# Patient Record
Sex: FEMALE | Race: WHITE
[De-identification: names, ages, dates, MRNs, and addresses within clinical notes are randomized per-mention and may not be internally consistent; named-entity substitution may affect disease eponyms.]

---

## 2017-02-20 ENCOUNTER — HOSPITAL ENCOUNTER (OUTPATIENT)
Dept: HOSPITAL 17 - PLAB | Age: 57
End: 2017-02-20
Attending: FAMILY MEDICINE
Payer: COMMERCIAL

## 2017-02-20 DIAGNOSIS — R73.01: ICD-10-CM

## 2017-02-20 DIAGNOSIS — R53.83: ICD-10-CM

## 2017-02-20 DIAGNOSIS — I25.10: Primary | ICD-10-CM

## 2017-02-20 DIAGNOSIS — E78.2: ICD-10-CM

## 2017-02-20 DIAGNOSIS — I10: ICD-10-CM

## 2017-02-20 DIAGNOSIS — M32.9: ICD-10-CM

## 2017-02-20 LAB
ALP SERPL-CCNC: 98 U/L (ref 45–117)
ALT SERPL-CCNC: 29 U/L (ref 10–53)
ANION GAP SERPL CALC-SCNC: 9 MEQ/L (ref 5–15)
AST SERPL-CCNC: 27 U/L (ref 15–37)
BILIRUB SERPL-MCNC: 0.4 MG/DL (ref 0.2–1)
BUN SERPL-MCNC: 15 MG/DL (ref 7–18)
CHLORIDE SERPL-SCNC: 104 MEQ/L (ref 98–107)
ERYTHROCYTE [DISTWIDTH] IN BLOOD BY AUTOMATED COUNT: 14.8 % (ref 11.6–17.2)
ERYTHROCYTE [SEDIMENTATION RATE] IN BLOOD BY WESTERGREN METHOD: 31 MM/HR (ref 0–30)
FERRITIN SERPL-MCNC: 33 NG/ML (ref 8–252)
GFR SERPLBLD BASED ON 1.73 SQ M-ARVRAT: 64 ML/MIN (ref 89–?)
HCO3 BLD-SCNC: 29 MEQ/L (ref 21–32)
HCT VFR BLD CALC: 32.8 % (ref 35–46)
HDLC SERPL-MCNC: 60 MG/DL (ref 40–60)
HEMOGLOBIN A1A: 1.1 %
HEMOGLOBIN A1B: 0.9 %
HEMOGLOBIN AO: 85.7 %
HEMOGLOBIN LA1C: 1.8 %
HEMOGLOBIN P3: 3.5 %
HGB F MFR BLD: 1.2 %
LDLC SERPL DIRECT ASSAY-MCNC: 90 MG/DL (ref 0–99)
LDLC SERPL-MCNC: 73 MG/DL (ref 0–99)
MAGNESIUM SERPL-MCNC: 2.3 MG/DL (ref 1.5–2.5)
MCH RBC QN AUTO: 26.7 PG (ref 27–34)
MCHC RBC AUTO-ENTMCNC: 32.5 % (ref 32–36)
MCV RBC AUTO: 82.1 FL (ref 80–100)
PLATELET # BLD: 303 TH/MM3 (ref 150–450)
POTASSIUM SERPL-SCNC: 4 MEQ/L (ref 3.5–5.1)
RBC # BLD AUTO: 4 MIL/MM3 (ref 4–5.3)
REVIEW FLAG: (no result)
SODIUM SERPL-SCNC: 142 MEQ/L (ref 136–145)
TRANSFERRIN IRON PROFILE: 335 MG/DL (ref 200–360)
WBC # BLD AUTO: 5.8 TH/MM3 (ref 4–11)

## 2017-02-20 PROCEDURE — 80053 COMPREHEN METABOLIC PANEL: CPT

## 2017-02-20 PROCEDURE — 85027 COMPLETE CBC AUTOMATED: CPT

## 2017-02-20 PROCEDURE — 83721 ASSAY OF BLOOD LIPOPROTEIN: CPT

## 2017-02-20 PROCEDURE — 80061 LIPID PANEL: CPT

## 2017-02-20 PROCEDURE — 83735 ASSAY OF MAGNESIUM: CPT

## 2017-02-20 PROCEDURE — 85652 RBC SED RATE AUTOMATED: CPT

## 2017-02-20 PROCEDURE — 83540 ASSAY OF IRON: CPT

## 2017-02-20 PROCEDURE — 83550 IRON BINDING TEST: CPT

## 2017-02-20 PROCEDURE — 82728 ASSAY OF FERRITIN: CPT

## 2017-02-20 PROCEDURE — 84443 ASSAY THYROID STIM HORMONE: CPT

## 2017-02-20 PROCEDURE — 83036 HEMOGLOBIN GLYCOSYLATED A1C: CPT

## 2017-05-02 ENCOUNTER — HOSPITAL ENCOUNTER (OUTPATIENT)
Dept: HOSPITAL 17 - PLAB | Age: 57
End: 2017-05-02
Attending: FAMILY MEDICINE
Payer: COMMERCIAL

## 2017-05-02 DIAGNOSIS — I25.10: Primary | ICD-10-CM

## 2017-05-02 DIAGNOSIS — I10: ICD-10-CM

## 2017-05-02 DIAGNOSIS — E78.2: ICD-10-CM

## 2017-05-02 DIAGNOSIS — R53.83: ICD-10-CM

## 2017-05-02 LAB
ALP SERPL-CCNC: 114 U/L (ref 45–117)
ALT SERPL-CCNC: 23 U/L (ref 10–53)
ANION GAP SERPL CALC-SCNC: 12 MEQ/L (ref 5–15)
AST SERPL-CCNC: 19 U/L (ref 15–37)
BILIRUB SERPL-MCNC: 0.4 MG/DL (ref 0.2–1)
BUN SERPL-MCNC: 12 MG/DL (ref 7–18)
CHLORIDE SERPL-SCNC: 100 MEQ/L (ref 98–107)
ERYTHROCYTE [DISTWIDTH] IN BLOOD BY AUTOMATED COUNT: 17.6 % (ref 11.6–17.2)
GFR SERPLBLD BASED ON 1.73 SQ M-ARVRAT: 79 ML/MIN (ref 89–?)
HCO3 BLD-SCNC: 31.8 MEQ/L (ref 21–32)
HCT VFR BLD CALC: 31.1 % (ref 35–46)
HDLC SERPL-MCNC: 41.5 MG/DL (ref 40–60)
LDLC SERPL DIRECT ASSAY-MCNC: 79 MG/DL (ref 0–99)
LDLC SERPL-MCNC: 67 MG/DL (ref 0–99)
MCH RBC QN AUTO: 25.4 PG (ref 27–34)
MCHC RBC AUTO-ENTMCNC: 31.2 % (ref 32–36)
MCV RBC AUTO: 81.5 FL (ref 80–100)
PLATELET # BLD: 328 TH/MM3 (ref 150–450)
POTASSIUM SERPL-SCNC: 3.4 MEQ/L (ref 3.5–5.1)
RBC # BLD AUTO: 3.81 MIL/MM3 (ref 4–5.3)
REVIEW FLAG: (no result)
SODIUM SERPL-SCNC: 144 MEQ/L (ref 136–145)
WBC # BLD AUTO: 8.4 TH/MM3 (ref 4–11)

## 2017-05-02 PROCEDURE — 84443 ASSAY THYROID STIM HORMONE: CPT

## 2017-05-02 PROCEDURE — 80061 LIPID PANEL: CPT

## 2017-05-02 PROCEDURE — 85027 COMPLETE CBC AUTOMATED: CPT

## 2017-05-02 PROCEDURE — 83721 ASSAY OF BLOOD LIPOPROTEIN: CPT

## 2017-05-02 PROCEDURE — 80053 COMPREHEN METABOLIC PANEL: CPT

## 2017-05-04 ENCOUNTER — HOSPITAL ENCOUNTER (OUTPATIENT)
Dept: HOSPITAL 17 - NEPC | Age: 57
Setting detail: OBSERVATION
LOS: 2 days | Discharge: HOME | End: 2017-05-06
Attending: FAMILY MEDICINE | Admitting: FAMILY MEDICINE
Payer: COMMERCIAL

## 2017-05-04 VITALS
HEART RATE: 63 BPM | DIASTOLIC BLOOD PRESSURE: 58 MMHG | OXYGEN SATURATION: 99 % | SYSTOLIC BLOOD PRESSURE: 112 MMHG | RESPIRATION RATE: 16 BRPM

## 2017-05-04 VITALS
HEART RATE: 69 BPM | SYSTOLIC BLOOD PRESSURE: 119 MMHG | RESPIRATION RATE: 20 BRPM | OXYGEN SATURATION: 100 % | DIASTOLIC BLOOD PRESSURE: 62 MMHG

## 2017-05-04 VITALS
RESPIRATION RATE: 16 BRPM | TEMPERATURE: 98.1 F | OXYGEN SATURATION: 97 % | DIASTOLIC BLOOD PRESSURE: 70 MMHG | SYSTOLIC BLOOD PRESSURE: 150 MMHG | HEART RATE: 78 BPM

## 2017-05-04 VITALS
HEART RATE: 70 BPM | OXYGEN SATURATION: 93 % | RESPIRATION RATE: 20 BRPM | TEMPERATURE: 98.2 F | SYSTOLIC BLOOD PRESSURE: 149 MMHG | DIASTOLIC BLOOD PRESSURE: 67 MMHG

## 2017-05-04 VITALS
RESPIRATION RATE: 18 BRPM | HEART RATE: 70 BPM | SYSTOLIC BLOOD PRESSURE: 127 MMHG | OXYGEN SATURATION: 100 % | TEMPERATURE: 98.3 F | DIASTOLIC BLOOD PRESSURE: 68 MMHG

## 2017-05-04 VITALS — WEIGHT: 200.62 LBS | HEIGHT: 66 IN | BODY MASS INDEX: 32.24 KG/M2

## 2017-05-04 VITALS — HEART RATE: 78 BPM

## 2017-05-04 VITALS
DIASTOLIC BLOOD PRESSURE: 63 MMHG | RESPIRATION RATE: 16 BRPM | SYSTOLIC BLOOD PRESSURE: 129 MMHG | HEART RATE: 66 BPM | OXYGEN SATURATION: 100 %

## 2017-05-04 DIAGNOSIS — I42.9: ICD-10-CM

## 2017-05-04 DIAGNOSIS — Z86.73: ICD-10-CM

## 2017-05-04 DIAGNOSIS — G43.909: ICD-10-CM

## 2017-05-04 DIAGNOSIS — K21.9: ICD-10-CM

## 2017-05-04 DIAGNOSIS — Z96.641: ICD-10-CM

## 2017-05-04 DIAGNOSIS — Z88.0: ICD-10-CM

## 2017-05-04 DIAGNOSIS — I25.110: Primary | ICD-10-CM

## 2017-05-04 DIAGNOSIS — Z91.048: ICD-10-CM

## 2017-05-04 DIAGNOSIS — F41.9: ICD-10-CM

## 2017-05-04 DIAGNOSIS — I10: ICD-10-CM

## 2017-05-04 DIAGNOSIS — E78.00: ICD-10-CM

## 2017-05-04 DIAGNOSIS — M32.9: ICD-10-CM

## 2017-05-04 DIAGNOSIS — Z79.82: ICD-10-CM

## 2017-05-04 DIAGNOSIS — J45.909: ICD-10-CM
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
## 2017-05-04 DIAGNOSIS — T82.898A: ICD-10-CM

## 2017-05-04 DIAGNOSIS — E87.6: ICD-10-CM

## 2017-05-04 DIAGNOSIS — Z79.52: ICD-10-CM

## 2017-05-04 DIAGNOSIS — Z88.8: ICD-10-CM

## 2017-05-04 LAB
ALP SERPL-CCNC: 125 U/L (ref 45–117)
ALT SERPL-CCNC: 22 U/L (ref 10–53)
ANION GAP SERPL CALC-SCNC: 9 MEQ/L (ref 5–15)
APTT BLD: 25.6 SEC (ref 24.3–30.1)
AST SERPL-CCNC: 17 U/L (ref 15–37)
BASOPHILS # BLD AUTO: 0 TH/MM3 (ref 0–0.2)
BASOPHILS NFR BLD: 0.3 % (ref 0–2)
BILIRUB SERPL-MCNC: 0.5 MG/DL (ref 0.2–1)
BUN SERPL-MCNC: 11 MG/DL (ref 7–18)
CHLORIDE SERPL-SCNC: 99 MEQ/L (ref 98–107)
CK MB SERPL-MCNC: (no result) NG/ML (ref 0.5–3.6)
CK SERPL-CCNC: 130 U/L (ref 26–192)
CK SERPL-CCNC: 87 U/L (ref 26–192)
CK SERPL-CCNC: 89 U/L (ref 26–192)
EOSINOPHIL # BLD: 0.1 TH/MM3 (ref 0–0.4)
EOSINOPHIL NFR BLD: 0.9 % (ref 0–4)
ERYTHROCYTE [DISTWIDTH] IN BLOOD BY AUTOMATED COUNT: 17.8 % (ref 11.6–17.2)
GFR SERPLBLD BASED ON 1.73 SQ M-ARVRAT: 69 ML/MIN (ref 89–?)
HCO3 BLD-SCNC: 29.6 MEQ/L (ref 21–32)
HCT VFR BLD CALC: 33.1 % (ref 35–46)
HEMO FLAGS: (no result)
INR PPP: 1 RATIO
LYMPHOCYTES # BLD AUTO: 1.9 TH/MM3 (ref 1–4.8)
LYMPHOCYTES NFR BLD AUTO: 20.4 % (ref 9–44)
MCH RBC QN AUTO: 26 PG (ref 27–34)
MCHC RBC AUTO-ENTMCNC: 32.4 % (ref 32–36)
MCV RBC AUTO: 80.3 FL (ref 80–100)
MONOCYTES NFR BLD: 7 % (ref 0–8)
NEUTROPHILS # BLD AUTO: 6.8 TH/MM3 (ref 1.8–7.7)
NEUTROPHILS NFR BLD AUTO: 71.4 % (ref 16–70)
PLATELET # BLD: 342 TH/MM3 (ref 150–450)
POTASSIUM SERPL-SCNC: 3.2 MEQ/L (ref 3.5–5.1)
PROTHROMBIN TIME: 11.6 SEC (ref 9.8–11.6)
RBC # BLD AUTO: 4.12 MIL/MM3 (ref 4–5.3)
SODIUM SERPL-SCNC: 138 MEQ/L (ref 136–145)
WBC # BLD AUTO: 9.4 TH/MM3 (ref 4–11)

## 2017-05-04 PROCEDURE — 85610 PROTHROMBIN TIME: CPT

## 2017-05-04 PROCEDURE — 71010: CPT

## 2017-05-04 PROCEDURE — 82550 ASSAY OF CK (CPK): CPT

## 2017-05-04 PROCEDURE — 93459 L HRT ART/GRFT ANGIO: CPT

## 2017-05-04 PROCEDURE — 80053 COMPREHEN METABOLIC PANEL: CPT

## 2017-05-04 PROCEDURE — 83880 ASSAY OF NATRIURETIC PEPTIDE: CPT

## 2017-05-04 PROCEDURE — G0378 HOSPITAL OBSERVATION PER HR: HCPCS

## 2017-05-04 PROCEDURE — 99285 EMERGENCY DEPT VISIT HI MDM: CPT

## 2017-05-04 PROCEDURE — 84484 ASSAY OF TROPONIN QUANT: CPT

## 2017-05-04 PROCEDURE — C1760 CLOSURE DEV, VASC: HCPCS

## 2017-05-04 PROCEDURE — 93571 IV DOP VEL&/PRESS C FLO 1ST: CPT

## 2017-05-04 PROCEDURE — 85730 THROMBOPLASTIN TIME PARTIAL: CPT

## 2017-05-04 PROCEDURE — G0269 OCCLUSIVE DEVICE IN VEIN ART: HCPCS

## 2017-05-04 PROCEDURE — 82552 ASSAY OF CPK IN BLOOD: CPT

## 2017-05-04 PROCEDURE — C1893 INTRO/SHEATH, FIXED,NON-PEEL: HCPCS

## 2017-05-04 PROCEDURE — C1887 CATHETER, GUIDING: HCPCS

## 2017-05-04 PROCEDURE — 85025 COMPLETE CBC W/AUTO DIFF WBC: CPT

## 2017-05-04 PROCEDURE — 93005 ELECTROCARDIOGRAM TRACING: CPT

## 2017-05-04 PROCEDURE — 85002 BLEEDING TIME TEST: CPT

## 2017-05-04 PROCEDURE — C1769 GUIDE WIRE: HCPCS

## 2017-05-04 NOTE — RADRPT
EXAM DATE/TIME:  05/04/2017 16:20 

 

[facility] COMPARISON:     

CHEST PA & LAT, December 02, 2016, 12:29.

 

                     

INDICATIONS :     

Chest pain. 

                     

 

MEDICAL HISTORY :     

Hypertension.       CVA. Cardiomyopathy. Coronary artery disease. Asthma. Lupus. Hiatal hernia.    

 

SURGICAL HISTORY :     

Appendectomy. Cholecystectomy. CABG. Loop recorder. Cardiac cath. Hysterectomy. Tubal ligation. 

 

ENCOUNTER:     

Initial                                        

 

ACUITY:     

1 day      

 

PAIN SCORE:     

8/10

 

LOCATION:     

Bilateral chest 

 

FINDINGS:     

A single view of the chest demonstrates the lungs to be symmetrically aerated without evidence of mas
s, infiltrate or effusion.  The cardiomediastinal contours are unremarkable.  Osseous structures are 
intact.

 

CONCLUSION:     

No evidence of acute cardiopulmonary disease.

 

 

 

 Ayden Lawson MD on May 04, 2017 at 17:14           

Board Certified Radiologist.

 This report was verified electronically.

## 2017-05-04 NOTE — PD
HPI


Chief Complaint:  Chest Pain


Time Seen by Provider:  16:17


Travel History


International Travel<30 days:  No


Contact w/Intl Traveler<30days:  No


Traveled to known affect area:  No





History of Present Illness


HPI


57 y/o female presents with chest pain that started this morning.  She went to 

her cardiologist Dr. Fletcher who advised for her to come here.  She states she 

had a cardiac catheterization in 2013 and given her nickel allergy she had one 

vessel bypass surgery to her LAD lesion.  She states that was her last workup.  

She states she's only had a baby aspirin today.  She denies other concurrent 

complaints.  Duration is since this morning.  Quality is pressure.  Severity is 

moderate.





PFSH


Past Medical History


Arthritis:  Yes (generalized throughout body)


Asthma:  Yes


Autoimmune Disease:  Yes (Lupus)


Anxiety:  Yes


Depression:  No


Heart Rhythm Problems:  No


Cancer:  Yes (PRE CANCEROUS UTERUS)


Cardiac Catheterization:  Yes (2004 / 2013)


Cardiomyopathy:  Yes (CABG 2013)


Cardiovascular Problems:  Yes (CAD, ARRHYTHMIA, CORONARY ARTERY SPASMS)


High Cholesterol:  No


Chemotherapy:  No


Chest Pain:  No


Congestive Heart Failure:  No


COPD:  No


Cerebrovascular Accident:  Yes (OPEN HEART SURGERY)


Diabetes:  No


Diminished Hearing:  No


Endocrine:  No


Gastrointestinal Disorders:  Yes (IBS,GASTROPARESIS / NISSEN FUNDIPLICATION)


GERD:  Yes


Genitourinary:  No


Headaches:  No


Hepatitis:  No


Hiatal Hernia:  Yes (REPAIRED)


Hypertension:  Yes


Immune Disorder:  Yes (Lupus)


Kidney Stones:  No


Medical other:  Yes (RAYNAUDS DISEASE; PARTIAL SMALL BOWEL BLOCKAGE)


Musculoskeletal:  Yes (LOWER BACK SPONDYLOSIS, OSTEOARTHRITIS)


Neurologic:  Yes (SEVERE MIGRAINES, RESTLESS LEG,TIA, REYNAUDS, NEURITIS)


Psychiatric:  No


Reproductive:  Yes


Respiratory:  Yes (ASTHMA)


Immunizations Current:  Yes


Migraines:  Yes


Radiation Therapy:  No


Renal Failure:  No


Seizures:  No


Sickle Cell Disease:  No


Sleep Apnea:  No


Thyroid Disease:  No


Ulcer:  Yes


Influenza Vaccination:  Yes


PNEUMOCCOCAL Vaccine (Year):  2008


Pregnant?:  Not Pregnant


Menopausal:  Yes


Tubal Ligation:  Yes (1988)





Past Surgical History


Abdominal Surgery:  Yes (abdominal mass removed, CHOLY,APPY)


AICD:  No


Appendectomy:  Yes (1970)


Arteriovenous Shunt:  No


Body Medical Devices:  LOOP RECORDER FOR ARRYTHMIA, 4 PLATES, 24 SCREWS CHEST


Cardiac Surgery:  Yes ( CABGX1 OCT. 2013, LOOP RECORDER IMPLANTED 1/2015, 

CARDIAC CATH, CABG, )


Cholecystectomy:  Yes (1999)


Ear Surgery:  No


Endocrine Surgery:  No


Eye Surgery:  No


Genitourinary Surgery:  Yes ( bladder lift X2)


Gynecologic Surgery:  Yes (cyst removed 1981,UTERINE ABLATION TOTAL 

HYSTERECTOMY 1994)


Hysterectomy:  Yes


Insulin Pump:  No


Joint Replacement:  Yes (RIGHT HIP REPLACEMENT)


Neurologic Surgery:  Yes (L4, L5, L6)


Oral Surgery:  No


Pacemaker:  No


Thoracic Surgery:  Yes (06/2015 STERNAL WIRES REMOVED)


Other Surgery:  Yes (SEE OTHER FOR LIST)





Social History


Alcohol Use:  Yes (SOCIAL)


Tobacco Use:  No


Substance Use:  No





Allergies-Medications


(Allergen,Severity, Reaction):  


Coded Allergies:  


     Advair (Verified  Allergy, Severe, Shortness of Breath, 5/4/17)


     HMG-CoA Reductase Inhibitors (Unverified  Allergy, Severe, MUSCLE ACHES, 

JOINT PAIN, SENSITIVE SKIN, 5/4/17)


 FOGGY BRAIN


     Lovastatin (Verified  Allergy, Severe, COUGH, BREATHING DIFFICULTY, HEADACH

, 5/4/17)


     Norvasc (Verified  Allergy, Severe, RASH ON LEGS, EDEMA, 5/4/17)


     Penicillin (Verified  Allergy, Severe, Anaphylaxis, 5/4/17)


     Reglan (Unverified  Allergy, Severe, NEUROLOGICAL EFFECT, WORSENS RESTLEG 

AND CAUSES TREMOR, 5/4/17)


 NEUROLOGICAL EFFECT, WORSENS RESTLEG AND CAUSES TREMOR


     Adhesives (Verified  Allergy, Mild, RASH, 5/4/17)


 MAY USE PAPER TAPE ONLY AND TEGADERM


     Plaquenil (Verified  Adverse Reaction, Severe, MUSCLE ACHING, 5/4/17)


 VISUAL DISTURBANCES


     Lipitor (Verified  Adverse Reaction, Intermediate, NEURALGIA, 5/4/17)


Uncoded Allergies:  


     NICKEL (Allergy, Intermediate, RASH, BURN, 6/15/15)


 CONFIRMED 8/29/14 TM


     STERI STRIPS (Allergy, Intermediate, RASH, BURN, 6/15/15)


 CONFIRMED 8/29/14 TM


Reported Meds & Prescriptions





Reported Meds & Active Scripts


Active


Reported


Tylenol (Acetaminophen) 325 Mg Tab 650 Mg PO Q4H PRN


Tylenol (Acetaminophen) 325 Mg Tab 650 Mg PO HS


Aspirin Adult Low Strength (Aspirin) 81 Mg Tabdr 81 Mg PO DAILY


Restoril (Temazepam) 15 Mg Cap 15-30 Mg PO HS


Gabapentin 600 Mg Tab 1,200 Mg PO BID


Folate (Folic Acid) 1 Mg Tab 1 Mg PO HS


Tessalon Perles (Benzonatate) 100 Mg Cap 200 Mg PO TID PRN


Lidoderm Patch 12 HR (Lidocaine) 5% Patch 1 Patch TOPICAL DAILY


     Remove patch after 12 hours


Zofran Odt (Ondansetron Odt) 8 Mg Tab 8 Mg SL Q8H PRN


Butorphanol Nasal Spray (Butorphanol Tartrate) 10 Mg/Ml Soln 1 Spray NASAL Q4HR 

PRN


     in one nostril (1 mg). If pain not reliefed in 60-90 minutes, a 1 mg


     repeat dose may be given.  May repeat this in 3-4 hrs if needed.


Epipen 2-Frank Inj (Epinephrine) 0.3 Mg/0.3 Ml Pfpen 0.3 Mg IM ONCE PRN


Xopenex Hfa 15 GM Inh (Levalbuterol 15 GM Inh) 45 Mcg/Act Aer 45 Mcg INH Q6HR


     Shake well before using. (1 puff = 45 mcg)


Xopenex Neb (Levalbuterol HCl) 0.63 Mg/3 Ml Neb 0.63 Mg NEB QID


Ipratropium Neb (Ipratropium Bromide) 0.5 Mg/2.5 Ml Amp 0.5 Mg NEB Q6HR NEB


Amlodipine (Amlodipine Besylate) 2.5 Mg Tab 2.5 Mg PO DAILY PRN


Singulair (Montelukast Sodium) 10 Mg Tab 10 Mg PO HS


Incruse Ellipta Inh (Umeclidinium Bromide Inh) 0.0625 Mg/Act Inh 1 Puff INH 

DAILY


Methotrexate 2.5 Mg Tab 15 Mg PO WEEKLY


Tramadol (Tramadol HCl) 50 Mg Tab 50 Mg PO Q6H


Atenolol 50 Mg Tab 50 Mg PO HS


Cartia Xt (Diltiazem ER 24 HR) 120 Mg Caper 120 Mg PO DAILY


Torsemide 20 Mg Tab 20 Mg PO DAILY


Azithromycin 250 Mg Tab 250 Mg PO HS


     On for 1 week, off for 1 week


Protonix (Pantoprazole Sodium) 40 Mg Tab 40 Mg PO HS








Review of Systems


Except as stated in HPI:  all other systems reviewed are Neg





Physical Exam


Narrative


GENERAL: Well-nourished, well-developed patient.  Well-appearing


SKIN: Warm and dry.


HEAD: Normocephalic and atraumatic.


EYES: No injection or drainage. 


ENT: No nasal drainage noted. 


NECK: Supple, trachea midline.


CARDIOVASCULAR: Regular rate and rhythm 


RESPIRATORY: Breath sounds equal bilaterally at apices. No accessory muscle use.


GASTROINTESTINAL: Abdomen soft, non-tender, nondistended. 


EXTREMITIES: No edema.


NEUROLOGICAL: Awake and alert. Motor and sensory grossly within normal limits. 

Normal speech.





Data


Data


Last Documented VS





Vital Signs








  Date Time  Temp Pulse Resp B/P Pulse Ox O2 Delivery O2 Flow Rate FiO2


 


5/4/17 17:26  63 16 112/58 99 Nasal Cannula 2 


 


5/4/17 16:10 98.3       








Orders





 Complete Blood Count With Diff (5/4/17 16:17)


Comprehensive Metabolic Panel (5/4/17 16:17)


B-Type Natriuretic Peptide (5/4/17 16:17)


Act Partial Throm Time (Ptt) (5/4/17 16:17)


Prothrombin Time / Inr (Pt) (5/4/17 16:17)


Ckmb (Isoenzyme) Profile (5/4/17 16:17)


Troponin I (5/4/17 16:17)


Iv Access Insert/Monitor (5/4/17 16:17)


Electrocardiogram (5/4/17 16:17)


Ecg Monitoring (5/4/17 16:17)


Oximetry (5/4/17 16:17)


Chest, Single Ap (5/4/17 16:17)


Sodium Chloride 0.9% Flush (Ns Flush) (5/4/17 16:30)


Aspirin (Aspirin) (5/4/17 16:45)


Nitroglycerin Sl (Nitrostat Sl) (5/4/17 16:45)


CKMB (5/4/17 16:22)


CKMB% (5/4/17 16:22)


Admit Order (Ed Use Only) (5/4/17 18:10)





Labs





 Laboratory Tests








Test 5/4/17





 16:22


 


White Blood Count 9.4 TH/MM3


 


Red Blood Count 4.12 MIL/MM3


 


Hemoglobin 10.7 GM/DL


 


Hematocrit 33.1 %


 


Mean Corpuscular Volume 80.3 FL


 


Mean Corpuscular Hemoglobin 26.0 PG


 


Mean Corpuscular Hemoglobin 32.4 %





Concent 


 


Red Cell Distribution Width 17.8 %


 


Platelet Count 342 TH/MM3


 


Mean Platelet Volume 9.1 FL


 


Neutrophils (%) (Auto) 71.4 %


 


Lymphocytes (%) (Auto) 20.4 %


 


Monocytes (%) (Auto) 7.0 %


 


Eosinophils (%) (Auto) 0.9 %


 


Basophils (%) (Auto) 0.3 %


 


Neutrophils # (Auto) 6.8 TH/MM3


 


Lymphocytes # (Auto) 1.9 TH/MM3


 


Monocytes # (Auto) 0.7 TH/MM3


 


Eosinophils # (Auto) 0.1 TH/MM3


 


Basophils # (Auto) 0.0 TH/MM3


 


CBC Comment DIFF FINAL 


 


Differential Comment  


 


Prothrombin Time 11.6 SEC


 


Prothromb Time International 1.0 RATIO





Ratio 


 


Activated Partial 25.6 SEC





Thromboplast Time 


 


Sodium Level 138 MEQ/L


 


Potassium Level 3.2 MEQ/L


 


Chloride Level 99 MEQ/L


 


Carbon Dioxide Level 29.6 MEQ/L


 


Anion Gap 9 MEQ/L


 


Blood Urea Nitrogen 11 MG/DL


 


Creatinine 0.85 MG/DL


 


Estimat Glomerular Filtration 69 ML/MIN





Rate 


 


Random Glucose 84 MG/DL


 


Calcium Level 8.9 MG/DL


 


Total Bilirubin 0.5 MG/DL


 


Aspartate Amino Transf 17 U/L





(AST/SGOT) 


 


Alanine Aminotransferase 22 U/L





(ALT/SGPT) 


 


Alkaline Phosphatase 125 U/L


 


Total Creatine Kinase 130 U/L


 


Creatine Kinase MB LESS THAN 0.5





 NG/ML


 


Troponin I LESS THAN 0.02





 NG/ML


 


B-Type Natriuretic Peptide 77 PG/ML


 


Total Protein 8.2 GM/DL


 


Albumin 4.0 GM/DL











[facility]


Medical Decision Making


Medical Screen Exam Complete:  Yes


Emergency Medical Condition:  Yes


Medical Record Reviewed:  Yes (past history confirmed)


Interpretation(s)


EKG shows NSR, no ST elevation or depression, and no arrhythmias.  No  

significant T-wave inversions.


CBC & BMP Diagram


5/4/17 16:22











Last 24 hours Impressions








Chest X-Ray 5/4/17 1617 Signed





Impressions: 





 Service Date/Time:  Thursday, May 4, 2017 16:20 - CONCLUSION:  No evidence of 





 acute cardiopulmonary disease.     Ayden Lawson MD 








Differential Diagnosis


Cardiac, musculoskeletal, gastritis, pneumothorax


Narrative Course


Will check blood work, EKG and dose with aspirin and nitroglycerin and 

reevaluate





ed workup no acute, will discuss with her cardiologist





patient agrees to [facility] observation





Physician Communication


Physician Communication


dr manriquez states ok for [facility]





Diagnosis





 Primary Impression:  


 Chest pain


 Qualified Code:  R07.9 - Chest pain, unspecified type





Admitting Information


Admitting Physician Requests:  Observation








Korin Plummer MD May 4, 2017 16:47

## 2017-05-05 VITALS — HEART RATE: 67 BPM

## 2017-05-05 VITALS
RESPIRATION RATE: 20 BRPM | DIASTOLIC BLOOD PRESSURE: 56 MMHG | SYSTOLIC BLOOD PRESSURE: 102 MMHG | OXYGEN SATURATION: 96 % | TEMPERATURE: 98.5 F | HEART RATE: 70 BPM

## 2017-05-05 VITALS
OXYGEN SATURATION: 97 % | SYSTOLIC BLOOD PRESSURE: 116 MMHG | HEART RATE: 80 BPM | DIASTOLIC BLOOD PRESSURE: 63 MMHG | RESPIRATION RATE: 16 BRPM | TEMPERATURE: 98.1 F

## 2017-05-05 VITALS
DIASTOLIC BLOOD PRESSURE: 68 MMHG | OXYGEN SATURATION: 98 % | HEART RATE: 76 BPM | RESPIRATION RATE: 18 BRPM | SYSTOLIC BLOOD PRESSURE: 108 MMHG

## 2017-05-05 VITALS
TEMPERATURE: 98.4 F | SYSTOLIC BLOOD PRESSURE: 114 MMHG | HEART RATE: 79 BPM | RESPIRATION RATE: 18 BRPM | DIASTOLIC BLOOD PRESSURE: 58 MMHG | OXYGEN SATURATION: 98 %

## 2017-05-05 VITALS
RESPIRATION RATE: 16 BRPM | DIASTOLIC BLOOD PRESSURE: 73 MMHG | OXYGEN SATURATION: 100 % | SYSTOLIC BLOOD PRESSURE: 118 MMHG | HEART RATE: 83 BPM | TEMPERATURE: 98.1 F

## 2017-05-05 VITALS — HEART RATE: 74 BPM

## 2017-05-05 VITALS
DIASTOLIC BLOOD PRESSURE: 56 MMHG | OXYGEN SATURATION: 97 % | SYSTOLIC BLOOD PRESSURE: 92 MMHG | TEMPERATURE: 98.2 F | HEART RATE: 65 BPM | RESPIRATION RATE: 16 BRPM

## 2017-05-05 VITALS — HEART RATE: 82 BPM

## 2017-05-05 VITALS — HEART RATE: 69 BPM

## 2017-05-05 VITALS
OXYGEN SATURATION: 96 % | DIASTOLIC BLOOD PRESSURE: 62 MMHG | SYSTOLIC BLOOD PRESSURE: 134 MMHG | HEART RATE: 84 BPM | RESPIRATION RATE: 20 BRPM | TEMPERATURE: 99 F

## 2017-05-05 VITALS — OXYGEN SATURATION: 97 %

## 2017-05-05 VITALS — HEART RATE: 84 BPM

## 2017-05-05 VITALS — HEART RATE: 76 BPM

## 2017-05-05 RX ADMIN — OXYCODONE HYDROCHLORIDE AND ACETAMINOPHEN PRN TAB: 10; 325 TABLET ORAL at 20:38

## 2017-05-05 RX ADMIN — NITROGLYCERIN SCH INCH: 20 OINTMENT TOPICAL at 10:00

## 2017-05-05 RX ADMIN — GABAPENTIN SCH MG: 300 CAPSULE ORAL at 10:29

## 2017-05-05 RX ADMIN — TORSEMIDE SCH MG: 20 TABLET ORAL at 10:27

## 2017-05-05 RX ADMIN — GABAPENTIN SCH MG: 300 CAPSULE ORAL at 21:00

## 2017-05-05 RX ADMIN — NITROGLYCERIN SCH INCH: 20 OINTMENT TOPICAL at 19:01

## 2017-05-05 RX ADMIN — ASPIRIN SCH MG: 325 TABLET ORAL at 10:28

## 2017-05-05 RX ADMIN — DILTIAZEM HYDROCHLORIDE SCH MG: 120 CAPSULE, EXTENDED RELEASE ORAL at 10:28

## 2017-05-05 NOTE — MP
cc:

MELISSA FLETCHER MD, MARK

****

 

 

DATE OF SURGERY

5/5/17

 

INDICATIONS

Unstable angina, known prior coronary artery disease refuses stress testing.



PROCEDURE:

1.  Bilateral coronaries.

2.  LV gram

3.  Sedation.

4.  Left internal mammary angiogram.

5.  Right femoral angiogram.

6.  Angio-Seal placement.

7.  FFR measurement



 

CONSENT:

Full informed consent was obtained prior to  procedure. Risks of death,

bleeding, myocardial infarction, perforation, aspiration, foreseen and

unforeseen complications reviewed.  The patient fully appeared to understand

the risks and was willing to proceed.

 

PROCEDURAL STATEMENT:

Using ultrasound and a micropuncture technique, the right femoral artery was

entered.  The 4-[place] sheath was placed. Prior to this, the patient was given

Versed and fentanyl, large doses of both had to be used. Via the 4-[place]

sheath a full  heart catheterization was performed including LV and both

coronaries.

An LAD lesion was noted and it was also noted that the internal mammary artery

was occluded. Internal mammary angiography was also carried out. At the end of

the catheterization procedure, it was decided to interrogate the LAD lesion

with a FFR.  The 4-[place] sheath was exchanged for a 6-[place] guide left

Wan 4 guide sheath. This  was placed and  the patient was given heparin

5500 units and an acceptable ACT was obtained of  242.  Once this was done,  a

prime wire was placed in the vessel and IR was carried out which was 0.91 which

is just above the limit of where intervention should take place. The LAD lesion

was, therefore, felt to be nonsignificant. Following this, FFR was performed

using intravenous adenosine. The adenosine drip was started and continued for 3

minutes.  The final FFR was 0.9.  The lowest FFR obtained was 0.81 which is

still above the limit for proceeding with stenting or bypass surgery. At this

point, all catheters were removed. The sheath was kept in place and an

angiogram performed to make sure that the left main and RV were intact.

Following this all catheters removed.  Femoral angiogram was performed and

Angio-Seal placed in the usual manner.

 

At the end of this procedure, the patient was returned to her room in stable

condition.

 



FINDINGS

HEMODYNAMICS

There was no evidence of significant gradient on pullback on the LV or aortic

valve.

 

The left anterior descending artery was a large vessel.  The proximal LAD was a

borderline 50-60% stenosis. As noted above. FFR was performed and by FFR the

lesion was not significant.  The circumflex vessel was large and free of

significant disease. The right coronary artery was a large vessel with a large

posterior descending artery and large posterolateral branches. It also free of

significant disease.

 

LV GRAM

Overall LV ejection fraction was normal at 60%.  The left internal mammary

artery was a small redundant vessel and was subtotally occluded at its

insertion into the left anterior descending artery.  LV function was normal,

estimated at 60%.

 

ASSESSMENT/PLAN

At this point, patient's left internal mammary graft has  occluded possibly

because of improvement in the LAD stenosis over time and competitive flow. At

this point, the lesion is borderline and by FFR and IGR is not significant and

therefore will be managed medically.

 

NOTE

The patient has a NICKEL ALLERGY  which makes it very difficult to consider

stenting of the LAD.  The internal mammary graft is occluded possibly secondary 
to good antegrade flow across the LAD

and by FFR calculations the lesion  does not require further intervention.

 

                              _________________________________

                              Melissa Fletcher MD, FRCP,[facility]

 

 

 

LEANN/

D:  5/5/2017/5:23 PM

T:  5/5/2017/9:39 PM

Visit #:  [id number]

Job #:  [id number]

KEERTHI

## 2017-05-05 NOTE — EKG
Date Performed: 05/04/2017       Time Performed: 19:42:56

 

PTAGE:      56 years

 

EKG:      Sinus rhythm 

 

 LOW QRS VOLTAGE IN PRECORDIAL LEADS NONSPECIFIC ST & T-WAVE ABNORMALITY BORDERLINE ECG

 

PREVIOUS TRACING       : 05/04/2017 16.06 Since previous tracing, no significant change noted

 

DOCTOR:   Romel Pena  Interpretating Date/Time  05/05/2017 13:16:51

## 2017-05-05 NOTE — EKG
Date Performed: 05/04/2017       Time Performed: 23:20:55

 

PTAGE:      56 years

 

EKG:      Sinus rhythm 

 

 NONSPECIFIC ST & T-WAVE ABNORMALITY BORDERLINE ECG

 

PREVIOUS TRACING       : 05/04/2017 19.42 Since previous tracing, no significant change noted

 

DOCTOR:   Romel Pena  Interpretating Date/Time  05/05/2017 13:17:40

## 2017-05-05 NOTE — HHI.HP
[facility]


Primary Care Physician


Terry Casiano MD


Chief Complaint


Chest pain


History of Present Illness


This is a 56-year-old female with history of CAD with a single vessel bypass in 

2013 with a complaint of chest discomfort that began yesterday morning.  She 

states that the discomfort began around 10:00.  It was in the center of her 

chest and radiated up into her neck.  She states concerned because he is a same 

type symptoms that she had leading up to her bypass.  It last for about 10 

minutes but continue to recur multiple times and in fact is too many times to 

count.  She also states over the last 2 weeks she's had swelling in her legs 

and dyspnea with walking.  These also were symptoms that she had prior to her 

needing her bypass.  He has continue to follow Dr. Fletcehr and states she just 

saw him yesterday.  Medications were being adjusted for the edema in her legs.  

Patient also had a heart catheterization January 2015 and the graft was patent.

  Cannot recall recent stress testing and then states that she does not want to 

have a stress test.  She states that stress test did not show that she needed a 

bypass and does not want to have that again.  She understands a heart 

catheterization is the only thing that's definitive.





Review of Systems


General: Patient denies fevers, chills recent, and recent travel


HEENT: Patient denies headache, sore throat, difficulty swallowing.  


Cardiovascular: Has the chest discomfort as mentioned above.  Denies sensation 

of heart beating rapidly or irregularly.  No syncope.  Denies diaphoresis.


Respiratory: She has been short of breath with walking 2 weeks.  Denies or 

inspirational chest discomfort.  Denies coughing wheezing or hemoptysis.  


GI: She did feel little nauseous yesterday.  Patient denies vomiting, diarrhea, 

abdominal pain, bloody stools.


Musculoskeletal: Patient denies joint pain or edema.  Denies calf pain.  

Complains of swelling in her legs for 2 weeks but improves when elevating the 

extremity.


Neurovascular: Patient denies numbness, tingling, weakness in extremities.  

Denies headache.


Endocrine: Denies polyuria and polydipsia.


Hematologic: Denies easy bruising.


Skin: Denies rash or itching.





Past Family Social History


Allergies:  


Coded Allergies:  


     Advair (Verified  Allergy, Severe, Shortness of Breath, 5/4/17)


     HMG-CoA Reductase Inhibitors (Unverified  Allergy, Severe, MUSCLE ACHES, 

JOINT PAIN, SENSITIVE SKIN, 5/4/17)


 FOGGY BRAIN


     Lovastatin (Verified  Allergy, Severe, COUGH, BREATHING DIFFICULTY, HEADACH

, 5/4/17)


     Norvasc (Verified  Allergy, Severe, RASH ON LEGS, EDEMA, 5/4/17)


     Penicillin (Verified  Allergy, Severe, Anaphylaxis, 5/4/17)


     Reglan (Unverified  Allergy, Severe, NEUROLOGICAL EFFECT, WORSENS RESTLEG 

AND CAUSES TREMOR, 5/4/17)


 NEUROLOGICAL EFFECT, WORSENS RESTLEG AND CAUSES TREMOR


     Adhesives (Verified  Allergy, Mild, RASH, 5/4/17)


 MAY USE PAPER TAPE ONLY AND TEGADERM


     Plaquenil (Verified  Adverse Reaction, Severe, MUSCLE ACHING, 5/4/17)


 VISUAL DISTURBANCES


     Lipitor (Verified  Adverse Reaction, Intermediate, NEURALGIA, 5/4/17)


Uncoded Allergies:  


     NICKEL (Allergy, Intermediate, RASH, BURN, 6/15/15)


 CONFIRMED 8/29/14 TM


     STERI STRIPS (Allergy, Intermediate, RASH, BURN, 6/15/15)


 CONFIRMED 8/29/14 TM


Past Medical History


CAD with a single-vessel bypass in 2013.  Hypertension, lupus, restless leg 

syndrome.  Denies hyperlipidemia and diabetes.


Past Surgical History


Single-vessel bypass 2013.


Reported Medications





Reported Meds & Active Scripts


Active


Reported


Tylenol (Acetaminophen) 325 Mg Tab 650 Mg PO Q4H PRN


Tylenol (Acetaminophen) 325 Mg Tab 650 Mg PO HS


Aspirin Adult Low Strength (Aspirin) 81 Mg Tabdr 81 Mg PO DAILY


Restoril (Temazepam) 15 Mg Cap 15-30 Mg PO HS


Gabapentin 600 Mg Tab 1,200 Mg PO BID


Folate (Folic Acid) 1 Mg Tab 1 Mg PO HS


Tessalon Perles (Benzonatate) 100 Mg Cap 200 Mg PO TID PRN


Lidoderm Patch 12 HR (Lidocaine) 5% Patch 1 Patch TOPICAL DAILY


     Remove patch after 12 hours


Zofran Odt (Ondansetron Odt) 8 Mg Tab 8 Mg SL Q8H PRN


Butorphanol Nasal Spray (Butorphanol Tartrate) 10 Mg/Ml Soln 1 Spray NASAL Q4HR 

PRN


     in one nostril (1 mg). If pain not reliefed in 60-90 minutes, a 1 mg


     repeat dose may be given.  May repeat this in 3-4 hrs if needed.


Epipen 2-Frank Inj (Epinephrine) 0.3 Mg/0.3 Ml Pfpen 0.3 Mg IM ONCE PRN


Xopenex Hfa 15 GM Inh (Levalbuterol 15 GM Inh) 45 Mcg/Act Aer 45 Mcg INH Q6HR


     Shake well before using. (1 puff = 45 mcg)


Xopenex Neb (Levalbuterol HCl) 0.63 Mg/3 Ml Neb 0.63 Mg NEB QID


Ipratropium Neb (Ipratropium Bromide) 0.5 Mg/2.5 Ml Amp 0.5 Mg NEB Q6HR NEB


Amlodipine (Amlodipine Besylate) 2.5 Mg Tab 2.5 Mg PO DAILY PRN


Singulair (Montelukast Sodium) 10 Mg Tab 10 Mg PO HS


Incruse Ellipta Inh (Umeclidinium Bromide Inh) 0.0625 Mg/Act Inh 1 Puff INH 

DAILY


Methotrexate 2.5 Mg Tab 15 Mg PO WEEKLY


Tramadol (Tramadol HCl) 50 Mg Tab 50 Mg PO Q6H


Atenolol 50 Mg Tab 50 Mg PO HS


Cartia Xt (Diltiazem ER 24 HR) 120 Mg Caper 120 Mg PO DAILY


Torsemide 20 Mg Tab 20 Mg PO DAILY


Azithromycin 250 Mg Tab 250 Mg PO HS


     On for 1 week, off for 1 week


Protonix (Pantoprazole Sodium) 40 Mg Tab 40 Mg PO HS


Active Ordered Medications





 Current Medications








 Medications


  (Trade)  Dose


 Ordered  Sig/Janie


 Route  Start Time


 Stop Time Status Last Admin


 


  (NS Flush)  2 ml  UNSCH  PRN


 IVF  5/4/17 16:30


     


 


 


  (Restoril)  15 mg  HS  PRN


 PO  5/4/17 21:30


    5/4/17 21:29


 


 


  (Tylenol)  650 mg  HS


 PO  5/5/17 21:00


     


 


 


  (Tenormin)  50 mg  HS


 PO  5/5/17 21:00


     


 


 


  (Cardizem Cd)  120 mg  DAILY


 PO  5/5/17 09:15


     


 


 


  (Folate)  1 mg  HS


 PO  5/5/17 21:00


     


 


 


  (Neurontin)  1,200 mg  BID


 PO  5/5/17 09:15


     


 


 


  (Singulair)  10 mg  HS


 PO  5/5/17 21:00


     


 


 


  (Protonix)  40 mg  HS


 PO  5/5/17 21:00


     


 


 


  (Demadex)  20 mg  DAILY


 PO  5/5/17 09:15


     


 


 


  (Ultram)  50 mg  Q6H


 PO  5/5/17 10:00


     


 


 


  (Aspirin)  325 mg  DAILY


 PO  5/5/17 09:15


     


 








Family History


There is no family history of CAD.


Social History


Patient does not smoke drink alcohol or use illicit drugs.  She is .  

She works at [employer].





Physical Exam


Vital Signs





 Vital Signs








  Date Time  Temp Pulse Resp B/P Pulse Ox O2 Delivery O2 Flow Rate FiO2


 


5/5/17 08:17 98.4 79 18 114/58 98   


 


5/5/17 07:39     97   21


 


5/5/17 04:00  84      


 


5/5/17 04:00 99.0 79 20 134/62 96   


 


5/5/17 00:00  66      


 


5/5/17 00:00 98.5 70 20 102/56 96   


 


5/4/17 20:55  78      


 


5/4/17 20:30 98.2 70 20 149/67 93   


 


5/4/17 20:00  66 16 129/63 99 Room Air  


 


5/4/17 20:00     100   


 


5/4/17 17:26  63 16 112/58 99 Nasal Cannula 2 


 


5/4/17 16:51  69 20 119/62 100 Nasal Cannula 2 


 


5/4/17 16:10 98.3 70 18 127/68 100   


 


5/4/17 15:54 98.1 78 16 150/70 97   








Physical Exam


GENERAL: This is a well-nourished, well-developed patient, in no apparent 

distress.  Patient speaks in clear complete sentences.  Patient is pleasant.


HEENT: Head is atraumatic and normocephalic.  Neck is supple without 

lymphadenopathy and trachea is midline.  No JVD or carotid bruits.


CARDIOVASCULAR: Regular rate and rhythm without murmurs, gallops, or rubs. 


RESPIRATORY: Clear to auscultation. Breath sounds equal bilaterally. No wheezes

, rales, or rhonchi.  Chest wall is nontender.  No use of accessory muscles.


GASTROINTESTINAL: Abdomen is nontender, nondistended.  Abdomen soft.  No 

obvious pulsatile mass or bruit.  No CVA tenderness.  Strong femoral pulses 

bilaterally.  Normal bowel sounds in all quadrants.


MUSCULOSKELETAL: Patient is moving upper and lower extremities freely.  No calf 

tenderness or edema, no Homans sign.  Strong pulses in upper and lower 

extremities.


NEUROLOGICAL: Patient is alert and oriented.  Cranial nerves 2-12 are grossly 

intact.  No focal deficits and speech is clear.


SKIN: No rash and turgor is normal.


Laboratory





Laboratory Tests








Test 5/4/17 5/4/17 5/4/17





 16:22 19:30 22:15


 


White Blood Count 9.4   


 


Red Blood Count 4.12   


 


Hemoglobin 10.7   


 


Hematocrit 33.1   


 


Mean Corpuscular Volume 80.3   


 


Mean Corpuscular Hemoglobin 26.0   


 


Mean Corpuscular Hemoglobin 32.4   





Concent   


 


Red Cell Distribution Width 17.8   


 


Platelet Count 342   


 


Mean Platelet Volume 9.1   


 


Neutrophils (%) (Auto) 71.4   


 


Lymphocytes (%) (Auto) 20.4   


 


Monocytes (%) (Auto) 7.0   


 


Eosinophils (%) (Auto) 0.9   


 


Basophils (%) (Auto) 0.3   


 


Neutrophils # (Auto) 6.8   


 


Lymphocytes # (Auto) 1.9   


 


Monocytes # (Auto) 0.7   


 


Eosinophils # (Auto) 0.1   


 


Basophils # (Auto) 0.0   


 


CBC Comment DIFF FINAL   


 


Differential Comment    


 


Prothrombin Time 11.6   


 


Prothromb Time International 1.0   





Ratio   


 


Activated Partial 25.6   





Thromboplast Time   


 


Sodium Level 138   


 


Potassium Level 3.2   


 


Chloride Level 99   


 


Carbon Dioxide Level 29.6   


 


Anion Gap 9   


 


Blood Urea Nitrogen 11   


 


Creatinine 0.85   


 


Estimat Glomerular Filtration 69   





Rate   


 


Random Glucose 84   


 


Calcium Level 8.9   


 


Total Bilirubin 0.5   


 


Aspartate Amino Transf 17   





(AST/SGOT)   


 


Alanine Aminotransferase 22   





(ALT/SGPT)   


 


Alkaline Phosphatase 125   


 


Total Creatine Kinase 130  89  87 


 


Creatine Kinase MB LESS THAN 0.5   


 


Troponin I LESS THAN 0.02  LESS THAN 0.02  LESS THAN 0.02 


 


B-Type Natriuretic Peptide 77   


 


Total Protein 8.2   


 


Albumin 4.0   








Result Diagram:  


5/4/17 1622                                                                    

            5/4/17 1622





Imaging





Last 48 hours Impressions








Chest X-Ray 5/4/17 1617 Signed





Impressions: 





 Service Date/Time:  Thursday, May 4, 2017 16:20 - CONCLUSION:  No evidence of 





 acute cardiopulmonary disease.     Ayden Lawson MD 








Course


EKGs have sinus rhythm with nonspecific lateral ST-T changes.





Assessment and Plan


Assessment and Plan


* Chest pain: Patient has had serial cardiac enzymes and EKGs for ruling out 

purposes.  She will be seen by Dr. Pena of cardiology in the chest pain 

center.  The patient does not want to have stress testing and would prefer 

cardiac catheterization.  I discussed this with Dr. Fletcher and he will 

evaluate the patient likely due heart catheterization today.  [facility]  A consult has 

been requested for Dr. Fletcher.


* CAD: This will be reassessed likely with heart catheterization by Dr. Fletcher 

today.


* Hypertension: Continue current medication.


* Hypokalemia: Patient was given potassium supplementation.


Patient is stable this time.  She is agreeable to this plan.








Skinny Bautista May 5, 2017 10:03

## 2017-05-05 NOTE — EKG
Date Performed: 05/04/2017       Time Performed: 16:06:10

 

PTAGE:      56 years

 

EKG:      Sinus rhythm 

 

 NONSPECIFIC ST & T-WAVE ABNORMALITY BORDERLINE ECG Since 

 

 PREVIOUS TRACING            , no significant change noted

 

DOCTOR:   Romel Pena  Interpretating Date/Time  05/05/2017 13:18:48

## 2017-05-05 NOTE — MB
cc:

MANDEEP SESAY M.D., RICHARD S. MD JAMIDAR, HUMAYUN A. MD

****

 

 

DATE OF CONSULTATION

5/5/17

 

HISTORY OF PRESENT ILLNESS

Thank you for asking us to see this 56-year-old white female who is in the

[facility].  She was in the office yesterday and complained of chest

pain very similar to her pain prior to bypass surgery. As you remember,  she

had bypass surgery in 2013. Over the years, the pain has improved, but over the

last two weeks she had leg swelling dyspnea  with walking and symptoms

reminiscent prior to needing a bypass.  She was offered a nuclear stress test

but declined stating that she would prefer a heart catheterization.  She is an

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

cath Department.

 

PAST MEDICAL HISTORY

1.  ASHD

2.  NICKEL ALLERGY, STERI-STRIP ALLERGY

3.  Hypertension

4.  Lupus

5.  Restless leg syndrome

6.  Single-vessel bypass surgery 2013.

7.  History of back surgery

8.  Hip surgery

9.  Gastroparesis

10.  Lower back spondylosis,

11.  Osteoarthritis

12.  Migraines

13.  Asthma

14.  Loop recorder

15.  Sternal wire removal,

16.  Cholecystectomy,

17.  Bladder left

18.  Uterine ablation,

19.  Hysterectomy,

20.  Cyst removed.

 

SOCIAL HISTORY

Alcohol.  No  tobacco.  No drugs.

 

MEDICATIONS

1.  Tylenol.

2.  Atenolol.

3.  Folic acid.

4.  Singulair.

5.  Protonix.

6.  Sodium chloride.

7.  Acetaminophen

8.  Percocet

9.  Atropine.

10.  Zofran.

11.  Tramadol.

12.  Nitroglycerin.

13.  Diltiazem

14.  Gabapentin

15.  Torsemide

16.  Albuterol

17.  Aspirin.



PHYSICAL EXAMINATION

VITAL SIGNS:  Pulse 84, respirations 20, blood pressure 134/62.  EYES:  Showed

no xanthelasma. Mouth shows no cyanosis or pallor.  Eyes were eyes and

protuberant.

HEART:  She had two heart sounds, no murmurs.

CHEST:  Clear.

ABDOMEN:  Soft, no hepatosplenomegaly.

EXTREMITIES: No evidence of edema.

NEUROLOGIC: Grossly intact.

PSYCHIATRIC: No suicidal ideation.

SKIN:  Intact.

 

LABORATORY DATA

White count 9.4, hemoglobin 10.7, platelet count 342,000, troponin x3 was less

than 0.02.  CK-MB was 87, LDL cholesterol 79, creatinine 0.85, INR 1.1.

 

CARDIOLOGY STUDIES

EKG showed sinus rhythm.

 

ASSESSMENT/PLAN

At this point, the patient appears to be having increasing unstable angina

reminiscent of her prior bypass surgery type pains.  She had a heart cath

about two and a half years ago which was negative.  Risks of heart cath

including death, bleeding, myocardial infarction, perforation, aspiration,

foreseen and unforeseen complications were reviewed.    The patient fully

appeared to understand the risks  and would like to proceed. We will plan to

proceed with this as soon as possible. On the basis of heart catheterization,

we will make a decision what furter treatment is needed.

 

 

 

                              _________________________________

                              Melissa Fletcher MD, CP,[facility]

 

 

 

LEANN/

D:  5/5/2017/5:16 PM

T:  5/5/2017/5:29 PM

Visit #:  [id number]

Job #:  [id number]

KEERTHI

## 2017-05-05 NOTE — CATHPROC
[employer] HIS Report

Study Information

Study Number   Scheduled Start         Study Start

 

[id number]      05/05/2017            May 5 2017 12:51PM

 

Referring Institution          Admit Source                  Facility Department

 

1                    Emergency department              [facility] - Cath Lab

 

Physician and Clinical Staff

Initial Melissa Epps    Circulator        Cezar Dickinson,GREG

 

                    Circulator        Anuel Wharton,RN

 

                    Recorder         Carlos Morejon,CAIOIS(BS)

 

                    Scrub          Chetna Barnard,RT(R) (BS)

 

Procedures Performed

Procedure                Location (Site)               Vessel Name

 

Angiogram LV              LV                      Ventricle

Coronary Angiograms           LCA                     Left Coronary

Coronary Angiograms           RCA                     Right Coronary

Coronary Angiograms           ARIAS Graft                  Left Coronary

L Heart Cath

Wire insertion             Fem Art (right)               Femoral Art

Equipment

Time        Description           Size      Mfg Part Number  Used/Scraped

                TRANSDUCER, TRUWAVE

13:22   [employer]                    *       [id number]      Used

                W/[employer]

                INTRODUCER SET,                [id number]-

13:22   [employer]                       FR 5               Used

                MICROPUNCTURE, STIFFENED            SC-[id number]



 



 



 



 



 



 

14:41   DAIG[employer]. [employer] ANGIOSEAL, FR6 VIP       FR 6      [id number]      Used

 

13:22   [employer]  PACK, CCL CUSTOM        *       [id number]    Used

 

13:22   [employer] PACER     PEN, SKIN DUAL W/ RULER     *       [id number]     Used

                                        [id number]

13:57   [employer]     SHEATH, FR6.5 PRELUDE 11CM   FR 6.5              Used

                                        [id number]

13:22   [employer]     WIRE, 3MMJ .035 180CM      180CM     [id number]    Used

                PROBE COVER, STERILE

13:22   [employer]                   *       [id number]      Used

                ULTRASOUND W/ GEL

13:22   NAMIC         MANIFOLD, 4 PORT        *       [id number]     Used

 

13:22   [employer]        OMNIPAQUE, 350 MG, 100ML    100ML     [id number]      Used

 

13:33   [employer]        OMNIPAQUE, 350 MG, 150ML    150ML     [id number]      Used

 

13:22   [employer]     BLANKET,WARM AIR CCL      *       [id number]      Used

 

13:22   [employer]    SHEATH, FR4 TERUMO (10CM)    FR 4      [id number]      Used

 

14:00   [employer]        PRIME WIRE, VERRATA 185CM    185CM     [id number]       Used

 

Equipment Model, Serial, Lot Number and Expiration Data

Description           Model Number      Serial Number  Lot Number       Expiration Date

 

ANGIOSEAL, FR6 VIP                            [id number]         02-

 

SHEATH, FR6.5 PRELUDE 11CM                        [id number]        01-

History: Allergies

Allergy               Reaction

 

Adhesives              RASH

 

Advair                Shortness of Breath

 

HMG-CoA Reductase Inhibitors     MUSCLE ACHES, JOINT PAIN, SENSITIVE SKIN

 

Lipitor               NEURALGIA

 

Lovastatin              COUGH, BREATHING DIFFICULTY, HEADACH

 

Norvasc               RASH ON LEGS, EDEMA

 

Penicillin              Anaphylaxis

 

Plaquenil              MUSCLE ACHING

 

Reglan                NEUROLOGICAL EFFECT, WORSENS RESTLEG AND CAUSES TREMOR

 

NICKEL                RASH, BURN

 

STERI STRIPS             RASH, BURN

 

 

History: Risk Factors

                      Family History of

Hypertension    Dyslipidemia                  Previous MI  Previous Heart Failure

                      Premature CAD

No         Yes           No           No       No

Prior Valve

          Prior PCI        Prior CABG     Prior CABGDate

Surgery

No         No           Yes         01/01/2013

          Cerebrovascular     Peripheral Artery   Chronic Lung

On Dialysis                                    Diabetes

          Disease         Disease        Disease

No         No           No           No       No

 

 

History: Symptoms/Diagnosis Selection Items

Chest pain

 

CASTILLO

 

 

History: Stress Tests

Stress or Imaging Studies Performed

 

Yes

Standard Exercise Stress

Test

No

 

Stress Echo

 

No

 

Stress Test SPECT

 

Yes

 

Stress Test CMR

 

No

 

Cardiac CTA           Coronary Calcium Score

 

No               No

History: Other

Current Smoker     Method

 

No           Cigarettes

 

Labs

Hgb (g/dl)       Hct (%)         WBC (l/cumm)        Platelets (thousands)

 

12.00-18.00       37.00-55.00       4.80-10.80         140..00

 

10.7          33.1           9.4             342

 

Glucose (mg/dl)     BUN (mg/dl)       Creatinine (mg/dl)    BUN:Creatinine (1:x)

60..00      8.00-20.00        0.10-9.00        10.00-20.00

 

84           11            0.8           13.8

 

Na (meq/l)       K (meq/l)

 

138..00      3.80-5.10

 

138           3.2

 

INR (PTT:PT)

 

0.50-2.00

 

1

 

Troponin I (ng/ml)   CPK (u/l)        CPK-MB (ng/ML)

 

0.40-2.30        37..00       0.00-7.00

 

0.02          87            Not Drawn

 

 

 

 

Medication

Medication Total Dose (Bolus/Oral)

Medication              Total Dosage/Unit

 

1% XYLOCAINE                20 mL

 

FENTANYL                  50 mcg

 

HEPARIN                 5500 units

 

VERSED                   4 mg

 

Medications (Bolus/Oral)

Medication          Time Given          Dosage/Unit       Administered By      Reason

 

FENTANYL           5/5/2017 1:24:16 PM      50 mcg        Anuel Wharton

50 mcg FENTANYL given in lab by Anuel Wharton RN in Right Antecubital via Peripheral IV. Ordered by
 Melissa Fletcher.

 

VERSED            5/5/2017 1:25:00 PM       2 mg         Anuel Wharton

2 mg VERSED given in lab by Anuel Wharton RN in Right Antecubital via Peripheral IV. Ordered by Melissa Lombardi.

 

VERSED            5/5/2017 1:27:00 PM       2 mg         Anuel Wharton

2 mg VERSED given in lab by Anuel Wharton RN in Right Antecubital via Peripheral IV. Ordered by Melissa Lombardi.

 

1% XYLOCAINE         5/5/2017 1:27:12 PM      20 mL         Melissa Fletcher

20 mL 1% XYLOCAINE given in lab by Melissa Fletcher in Right Groin via Subcutaneous. Ordered by Melissa Gupta.

 

HEPARIN            5/5/2017 1:59:09 PM     5500 units        Anuel Wharton

5500 units HEPARIN given in lab by Anuel Wharton RN in Right Antecubital via Peripheral IV. Ordered
 by Melissa Fletcher.

Medication (Drip)

Medication           Time Given         Dosage/Unit      Concentration/Unit  Diluent (ml)  Solution

 

ADENOSINE DRIP         5/5/2017 2:30:00 PM    140 mcg/kg/min        90 mg     90       NaCl .9

140 mcg/kg/min ADENOSINE DRIP given in lab by Anuel Wharton RN in Right Antecubital via Peripheral 
IV. Pump/Drip Flow = 764.4 ml/hr using NaCl

.9 with a concentration of 90 mg in 90 ml. Ordered by Melissa Fletcher.

 

Initial Case Assessment

Cardiovascular

HR             Rhythm           NIBP            Chest Pain

 

75             SR             121/72           2

 

Edema Present       Skin color             Skin

 

None            Normal               Warm Dry

 

Circulatory - Right Pulses

Dorsalis Pedis       Femoral

 

2             2

 

Scale (0,1,2,3,4,d)

 

Circulatory - Left Pulses

Dorsalis Pedis       Femoral

 

2             2

 

Scale (0,1,2,3,4,d)

 

Circulatory - Lower Extremities

Color Lower Right     Color Lower Left

 

 

Normal           Normal

 

Neurological State

              Oriented to time-place-

Alert                       Moves all extremities

              person

 

Respiration - General

Respiration Rate

              SpO2 (%)

(B/min)

9             100

Final Case Assessment

Cardiovascular

HR           Rhythm          NIBP          Chest Pain

 

75           SR            121/72         2

 

Edema Present      Skin color           Skin

 

None           Normal             Warm Dry

 

Circulatory - Right Pulses

Dorsalis Pedis     Femoral

 

2            2

 

Scale (0,1,2,3,4,d)

 

Circulatory - Left Pulses

Dorsalis Pedis     Femoral

 

2            2

 

Scale (0,1,2,3,4,d)

 

Circulatory - Lower Extremities

Color Lower Right    Color Lower Left

 

 

Normal         Normal

 

Neurological State

            Oriented to time-place-

Alert                      Moves all extremities

            person

 

Respiration - General

Respiration Rate

            SpO2 (%)

(B/min)

9            100

 

Chronological Log

Time    Study Chronological Log

 

12:51:24  Patient arrived via Bed.

 

12:51:25  Patient Name, D.O.B, / Armband Verified By R.N.

 

12:51:25  Consent signed by the physician and the patient and verified by the Cath Lab staff.

 

12:51:27  Pre-op and post- op instructions given; patient acknowledges understanding of instructions.


 

12:51:31  Patient has been NPO for Less than 6Hrs.

 

12:51:34  Skin Breakdown-

 

12:51:35  Patient Warmer Placed on the Table.

 

12:51:39  IV Warmer Connected To Patient.

 

12:51:40  A # 20 IV was noted in the Antecubital (right). Grade = 0

      Vitals capture started with the following parameters, Patient=Adult, Interval=5 min, Initial Pr
ebblxl=525 mmHg,

12:56:26

      Deflation Rate=5 mmHg

12:57:03  HR=77 bpm, VMRT=851/68 mmhg, XdX2=746.0 %, Resp=10 B/min

 

13:01:58  HR=77 bpm, XPEA=330/69 mmhg, IjH4=169.0 %, Resp=20 B/min

 

13:04:48  Reference ECG taken

13:06:57  HR=84 bpm, HSCD=157/72 mmhg, KmD1=995.0 %, Resp=8 B/min

 

13:10:07  History and physical on the chart or being dictated.

      Assessment: Initial Case, HR=75 BPM, Rhythm=SR, PNEK=006/72 mmhg, Chest Pain=2, Edema=None, Col
or=Normal,

      Skin = Warm, Dry

      Right Pulses: Marciano Ped=2, Femoral=2

      Left Pulses: Marciano Ped=2, Femoral=2

13:10:10

      Lower Right Extremities: Color=Normal

      Lower Left Extremities: Color=Normal

      Neurological: State=Alert, Ox3, SOUSA

      Respiration: Resp=9 B/min, AmZ1=327 %

13:10:12  Bilateral groins prepped with 2% chlorhexidine, and with a 3 min. waiting time.

 

13:12:00  HR=74 bpm, POIU=609/72 mmhg, DkY7=169.0 %, Resp=15 B/min, Pain=2, Key=10, Calvo=1

 

13:16:59  HR=75 bpm, GXAZ=859/68 mmhg, ZmI7=864.0 %, Resp=9 B/min, Pain=2, Eky=10, Calvo=1

 

13:21:53  MD arrived.

 

13:22:02  HR=73 bpm, DNBW=772/63 mmhg, SpO2=99.0 %, Resp=11 B/min, Pain=2, Key=10, Calvo=1

      50 mcg FENTANYL given in lab by Anuel Wharton, RN in Right Antecubital via Peripheral IV. Orde
red by Chance,

13:24:16

      Jozefun.

13:25:00  2 mg VERSED given in lab by Anuel Wharton, RN in Right Antecubital via Peripheral IV. Orde
red by Melissa Fletcher.

      Time Out. Correct patient, correct procedure,correct physician, ,power injector not loaded with
 contrast with surgical

13:25:19

      team present. Time Out Concurred by MD, individual staff in procedure

13:26:09  Case Start

 

13:27:00  2 mg VERSED given in lab by Anuel Wharton, RN in Right Antecubital via Peripheral IV. Orde
red by Melisas Fletcher.

 

13:27:01  HR=74 bpm, AVTR=647/64 mmhg, SpO2=97.0 %, Resp=7 B/min, Pain=2, Key=10, Calvo=1

      20 mL 1% XYLOCAINE given in lab by Melissa Fletcher in Right Groin via Subcutaneous. Ordered by
 Chance,

13:27:12

      Humanali.

13:27:18  Pressure channel 1 zeroed.

 

13:28:21  Access site was Right Femoral Artery.

      A INTRODUCER SET, MICROPUNCTURE, STIFFENED FR 5 was advanced into the Fem Art (right) using the


13:29:16

      Percutaneous technique.

      A SHEATH, FR4 TERUMO (10CM) FR 4 was exchanged in the Fem Art (right). This was necessary in or
laya to

13:29:22

      accomodate a larger catheter.

13:32:00  HR=74 bpm, RORT=465/67 mmhg, SpO2=95.0 %, Resp=21 B/min, Pain=2, Key=10, Calvo=1

      A JL 4.0 INFINITI CATHETER FR 4 was advanced over a wire. OMNIPAQUE, 350 MG, 150ML 150ML was us
ed for

13:32:30

      injections.

13:32:43  The  LCA was injected and visualized at various angles. OMNIPAQUE, 350 MG, 150ML 150ML used
.

      Recorded Pressure: Ao, HR=73, Condition=Condition 1

13:33:30

      (Aorta) Ao 99/54/73

      Recorded Pressure: Ao, HR=74, Condition=Condition 1

13:35:15

      (Aorta) Ao 97/57/74

13:36:59  HR=72 bpm, WYLO=484/62 mmhg, SpO2=93.0 %, Resp=28 B/min, Pain=2, Key=10, Calvo=1

 

13:38:34  Catheter was removed

 

13:39:33  A IM INFINITI CATHETER FR 4 was advanced over a wire. OMNIPAQUE, 350 MG, 150ML 150ML was us
ed for injections.

 

13:41:34  The ARIAS Graft was injected and visualized at various angles. OMNIPAQUE, 350 MG, 150ML 150M
L used.

 

13:42:02  HR=73 bpm, NIBP=97/60 mmhg, SpO2=93.0 %, Resp=24 B/min, Pain=2, Key=10, Calvo=1

 

13:42:59  Catheter was removed

      A 3DRC INFINITI CATHETER FR 4 was advanced over a wire. OMNIPAQUE, 350 MG, 150ML 150ML was used
 for

13:43:02

      injections.

13:44:49  The  RCA was injected and visualized at various angles. OMNIPAQUE, 350 MG, 150ML 150ML used
.

 

13:45:53  Catheter was removed

13:46:57  HR=76 bpm, QKVS=072/67 mmhg, SpO2=94.0 %, Resp=29 B/min, Pain=2, Key=10, Calvo=1

      A JL 4.0 INFINITI CATHETER FR 4 was advanced over a wire. OMNIPAQUE, 350 MG, 150ML 150ML was us
ed for

13:48:50

      injections.

13:49:00  The  LCA was injected and visualized at various angles. OMNIPAQUE, 350 MG, 150ML 150ML used
.

 

13:49:37  Catheter was removed

      A PIGTAIL ANG. INFINITI CATHETER FR 4 was advanced over a wire. OMNIPAQUE, 350 MG, 150ML 150ML 
was used

13:50:00

      for injections.

      Recorded Pressure: LV, HR=73, Condition=Condition 1

13:51:06

      (Left Ventricle) /-2/11

13:52:04  HR=74 bpm, HIJB=964/53 mmhg, SpO2=96.0 %, Resp=14 B/min, Pain=2, Key=10, Calvo=1

 

13:52:07  The LV was injected at 8 cc/sec for a total of 32. OMNIPAQUE, 350 MG, 150ML 150ML used.

      Recorded Pressure: LV, Ao, HR=75, Condition=Condition 1

13:52:51  (Left Ventricle) /-12/11,

      (Aorta) Ao 111/54/79

13:53:04  Catheter was removed

 

13:57:03  HR=75 bpm, HUID=163/68 mmhg, SpO2=95.0 %, Resp=21 B/min, Pain=2, Key=10, Calvo=1

      A SHEATH, FR6.5 PRELUDE 11CM FR 6.5 was exchanged in the Fem Art (right). This was necessary in
 order to

13:57:22

      accomodate a larger catheter.

      5500 units HEPARIN given in lab by Anuel Wharton, RN in Right Antecubital via Peripheral IV. O
rdered by Chance,

13:59:09

      Melissa.

14:02:02  HR=73 bpm, CGPY=890/65 mmhg, SpO2=93.0 %, Resp=12 B/min, Pain=2, Key=10, Calvo=1

 

14:07:03  HR=73 bpm, YHHV=957/63 mmhg, SpO2=95.0 %, Resp=18 B/min, Pain=2, Key=10, Calvo=1

 

14:09:14  Activated Clotting Time Drawn

      A JL 4.0 GUIDE CATHETER FR 6 was advanced over a wire. OMNIPAQUE, 350 MG, 150ML 150ML was used 
for

14:09:50

      injections.

14:10:14  Pressure channel 1 zeroed.

 

14:12:04  HR=71 bpm, JIFI=027/66 mmhg, SpO2=96.0 %, Resp=21 B/min, Pain=2, Key=10, Calvo=1

 

14:14:26  A PRIME WIRE, VERRATA 185CM 185CM was inserted via Fem Art (right).

 

14:14:31  ACT (Normal Range ) = 242

 

14:16:53  Flow Wire was was placed in the LAD Prox. The FFR measures 0.81 percent. The IFR measures 0
.9 Percent.

 

14:17:08  HR=67 bpm, XOMR=742/63 mmhg, SpO2=93.0 %, Resp=17 B/min, Pain=2, Key=10, Calvo=1

 

14:22:07  HR=69 bpm, LSCI=391/59 mmhg, SpO2=95.0 %, Resp=16 B/min, Pain=2, Key=10, Calvo=1

 

14:27:08  HR=71 bpm, GRRM=169/67 mmhg, SpO2=93.0 %, Resp=19 B/min, Pain=2, Key=10, Calvo=1

      140 mcg/kg/min ADENOSINE DRIP given in lab by Anuel Wharton, RN in Right Antecubital via Perip
heral IV. Pump/Drip

14:30:00

      Flow = 764.4 ml/hr using NaCl .9 with a concentration of 90 mg in 90 ml. Ordered by William Fletcher.

14:32:07  HR=89 bpm, EOHJ=988/56 mmhg, SpO2=87.0 %, Resp=16 B/min, Pain=2, Key=10, Calvo=1

 

14:34:45  The PRIME WIRE, VERRATA 185CM 185CM was removed.

 

14:35:04  Catheter was removed

 

14:35:37  Case End

 

14:37:51  HR=74 bpm, APLU=068/34 mmhg, SpO2=95.0 %, Resp=13 B/min, Pain=2, Key=10, Calvo=1

 

14:38:57  An injection in the Fem Art (right) was made through the SHEATH, FR6.5 PRELUDE 11CM FR 6.5.


 

14:42:05  HR=71 bpm, SKYW=296/67 mmhg, SpO2=94.0 %, Resp=16 B/min, Pain=2, Key=10, Calvo=1

       Assessment: Final Case, HR=75 BPM, Rhythm=SR, JHGJ=139/72 mmhg, Chest Pain=2, Edema=None, Dawson
r=Normal,

       Skin = Warm, Dry

       Right Pulses: Marciano Ped=2, Femoral=2

       Left Pulses: Marciano Ped=2, Femoral=2

14:42:09

       Lower Right Extremities: Color=Normal

       Lower Left Extremities: Color=Normal

       Neurological: State=Alert, Ox3, SOUSA

       Respiration: Resp=9 B/min, HhV7=113 %

14:42:20   ANGIOSEAL, FR6 VIP FR 6 placement in the Fem Art (right)

 

14:42:28   Sterile dressing applied to site

 

14:42:31   No case complications noted.

 

14:42:32   Cine recording checked.

 

14:42:38   Bedside Report will be given.

 

14:42:42   Contrast Scanned

 

14:42:50   A Left Heart Cath was performed.

 

14:42:52   Patient moved to stretcher

 

14:50:13   Vitals capture stopped.

 

 

 

 

End Study - Contrast Media Used In Study

Contrast        Total Opened (mL)     Total Used (mL)     Total Wasted (mL)

 

Omnipaque       150            150           0

 

End Study - Maximum Contrast Load

Max Contrast Load (mL)

 

568.8

 

End Study - Radiation Exposure

Fluoro Time

(minutes)

13.2

 

 

End Study - Patient Disposition

Complications     Transferred To

 

           Telemetry Bed

## 2017-05-06 VITALS
DIASTOLIC BLOOD PRESSURE: 57 MMHG | RESPIRATION RATE: 16 BRPM | OXYGEN SATURATION: 97 % | TEMPERATURE: 97.7 F | SYSTOLIC BLOOD PRESSURE: 100 MMHG | HEART RATE: 59 BPM

## 2017-05-06 VITALS
TEMPERATURE: 97.7 F | RESPIRATION RATE: 18 BRPM | SYSTOLIC BLOOD PRESSURE: 110 MMHG | DIASTOLIC BLOOD PRESSURE: 66 MMHG | OXYGEN SATURATION: 99 % | HEART RATE: 72 BPM

## 2017-05-06 VITALS — HEART RATE: 64 BPM

## 2017-05-06 VITALS — HEART RATE: 54 BPM

## 2017-05-06 VITALS — HEART RATE: 57 BPM

## 2017-05-06 VITALS — HEART RATE: 59 BPM

## 2017-05-06 VITALS — HEART RATE: 56 BPM

## 2017-05-06 VITALS — HEART RATE: 53 BPM

## 2017-05-06 RX ADMIN — DILTIAZEM HYDROCHLORIDE SCH MG: 120 CAPSULE, EXTENDED RELEASE ORAL at 08:31

## 2017-05-06 RX ADMIN — OXYCODONE HYDROCHLORIDE AND ACETAMINOPHEN PRN TAB: 10; 325 TABLET ORAL at 08:32

## 2017-05-06 RX ADMIN — TORSEMIDE SCH MG: 20 TABLET ORAL at 08:33

## 2017-05-06 RX ADMIN — ASPIRIN SCH MG: 325 TABLET ORAL at 08:32

## 2017-05-06 RX ADMIN — NITROGLYCERIN SCH INCH: 20 OINTMENT TOPICAL at 02:12

## 2017-05-06 RX ADMIN — GABAPENTIN SCH MG: 300 CAPSULE ORAL at 08:31

## 2017-05-06 NOTE — PD.CARD.PN
Subjective


Subjective Remarks


Feeling better. No CP or SOB.  Groin site stable.





Objective


Medications





Current Medications








 Medications


  (Trade)  Dose


 Ordered  Sig/Janie


 Route


 PRN Reason  Start Time


 Stop Time Status Last Admin


Dose Admin


 


 Sodium Chloride


  (NS Flush)  2 ml  UNSCH  PRN


 IVF


 FLUSH AFTER USING IV ACCESS  5/4/17 16:30


     


 


 


 Acetaminophen


  (Tylenol)  650 mg  HS


 PO


   5/5/17 21:00


    5/6/17 02:11


 


 


 Atenolol


  (Tenormin)  50 mg  HS


 PO


   5/5/17 21:00


    5/5/17 22:15


 


 


 Diltiazem HCl


  (Cardizem Cd)  120 mg  DAILY


 PO


   5/5/17 09:15


    5/5/17 10:28


 


 


 Folic Acid


  (Folate)  1 mg  HS


 PO


   5/5/17 21:00


    5/5/17 22:14


 


 


 Gabapentin


  (Neurontin)  1,200 mg  BID


 PO


   5/5/17 09:15


    5/5/17 21:00


 


 


 Montelukast Sodium


  (Singulair)  10 mg  HS


 PO


   5/5/17 21:00


    5/5/17 22:15


 


 


 Pantoprazole


 Sodium


  (Protonix)  40 mg  HS


 PO


   5/5/17 21:00


    5/5/17 22:14


 


 


 Torsemide


  (Demadex)  20 mg  DAILY


 PO


   5/5/17 09:15


    5/5/17 10:27


 


 


 Tramadol HCl


  (Ultram)  50 mg  Q6H


 PO


   5/5/17 10:00


    5/6/17 03:56


 


 


 Aspirin


  (Aspirin)  325 mg  DAILY


 PO


   5/5/17 09:15


    5/5/17 10:28


 


 


 Nitroglycerin 0.5


 inch  0.5 inch  Q8H


 TOPICAL


   5/5/17 10:00


    5/6/17 02:12


 


 


 Sodium Chloride


  (NS 1000 ml Inj)  1,000 ml @ 


 100 mls/hr  Q10H


 IV


   5/5/17 15:00


 5/6/17 14:59  5/5/17 20:39


 


 


 Acetaminophen


  (Tylenol)  325 mg  Q4H  PRN


 PO


 PAIN SCALE 1 TO 2  5/5/17 15:00


     


 


 


 Oxycodone/


 Acetaminophen


  (Percocet  5-325


 Mg)  1 tab  Q4H  PRN


 PO


 PAIN SCALE 3 TO 5  5/5/17 15:00


     


 


 


 Oxycodone/


 Acetaminophen


  (Percocet 


 Mg)  1 tab  Q4H  PRN


 PO


 PAIN SCALE 6 TO 10  5/5/17 15:00


    5/5/17 20:38


 


 


 Temazepam


  (Restoril)  15 mg  HS  PRN


 PO


 SLEEP  5/5/17 15:00


    5/5/17 22:15


 


 


 Atropine Sulfate


 0.5 mg  0.5 mg  UNSCH  PRN


 IV


 VAGAL REPONSE  5/5/17 15:00


     


 


 


 Sodium Chloride


  ( ml Inj)  250 ml @ 


 500 mls/hr  ONCE  PRN


 IV


 VAGAL REPONSE  5/5/17 15:00


 5/6/17 14:59   


 


 


 Ondansetron HCl


  (Zofran Inj)  4 mg  Q4H  PRN


 IV


 NAUSEA  5/5/17 15:00


     


 








Vital Signs / I&O





 Vital Signs








  Date Time  Temp Pulse Resp B/P Pulse Ox O2 Delivery O2 Flow Rate FiO2


 


5/6/17 07:00  64      


 


5/6/17 06:00  54      


 


5/6/17 05:00  54      


 


5/6/17 04:00  54      


 


5/6/17 03:20 97.7 59 16 100/57 97   


 


5/6/17 03:00  53      


 


5/6/17 02:00  59      


 


5/6/17 01:00  56      


 


5/6/17 00:00  57      


 


5/5/17 23:00 98.2 65 16 92/56 97   


 


5/5/17 23:00  65      


 


5/5/17 22:00  74      


 


5/5/17 21:00  82      


 


5/5/17 20:00  82      


 


5/5/17 19:20 98.1 83 16 118/73 100   


 


5/5/17 19:00  69      


 


5/5/17 18:00  67      


 


5/5/17 17:00  76      


 


5/5/17 16:00  76      


 


5/5/17 15:00  76      


 


5/5/17 15:00  77 18 108/68 98   


 


5/5/17 15:00  73      


 


5/5/17 12:30 98.1 80 16 116/63 97   


 


5/5/17 08:17 98.4 79 18 114/58 98   








 I/O








 5/5/17 5/5/17 5/5/17 5/6/17 5/6/17 5/6/17





 07:00 15:00 23:00 07:00 15:00 23:00


 


Intake Total   240 ml 1198 ml  


 


Output Total   1225 ml 50 ml  


 


Balance   -985 ml 1148 ml  


 


      


 


Intake Oral   240 ml 480 ml  


 


IV Total    718 ml  


 


Output Urine Total   1225 ml 50 ml  


 


# Voids 2   1  


 


# Bowel Movements 0  0 0  








Physical Exam


VSS, afeb


No JVD


Lungs: CTA


Heart RRR, no murmur.


Ext: Right groin stable no hematoma.  No edema


Neuro: Intact.


Laboratory





Laboratory Tests








Test 5/4/17 5/4/17 5/4/17





 16:22 19:30 22:15


 


Hemoglobin 10.7 GM/DL  





 (11.6-15.3)  


 


Hematocrit 33.1 %  





 (35.0-46.0)  


 


Mean Corpuscular Hemoglobin 26.0 PG  





 (27.0-34.0)  


 


Red Cell Distribution Width 17.8 %  





 (11.6-17.2)  


 


Neutrophils (%) (Auto) 71.4 %  





 (16.0-70.0)  


 


Potassium Level 3.2 MEQ/L  





 (3.5-5.1)  


 


Estimat Glomerular Filtration 69 ML/MIN (>89)  





Rate   


 


Alkaline Phosphatase 125 U/L  





 ()  


 


Creatine Kinase MB LESS THAN 0.5  





 NG/ML (0.5-3.6)  


 


Troponin I LESS THAN 0.02 LESS THAN 0.02 LESS THAN 0.02





 NG/ML NG/ML NG/ML





 (0.02-0.05) (0.02-0.05) (0.02-0.05)











Assessment and Plan


Problem List:  


(1) Coronary artery disease


Assessment and Plan:  s/p cardiac cath 





(2) S/P CABG x 1


(3) Hypercholesteremia


(4) Chest pain


Assessment and Plan


CV stable.  Resume home meds.  Discharge today.  Instructions given.  Patient 

and family questions answered.  F/U arranged with Dr. Fletcher.





Problem Qualifiers





(1) Chest pain:  


Qualified Code:  R07.9 - Chest pain, unspecified type





Terry Orozco MD May 6, 2017 08:17

## 2017-05-06 NOTE — EKG
Date Performed: 05/06/2017       Time Performed: 05:43:10

 

PTAGE:      56 years

 

EKG:      Sinus bradycardia Septal T wave changes are nonspecific Low QRS voltages in precordial lead
s Borderline ECG

 

PREVIOUS TRACING       : 05/04/2017 23.20

 

DOCTOR:   Claudio Beth  Interpretating Date/Time  05/06/2017 09:45:38

## 2017-05-09 ENCOUNTER — HOSPITAL ENCOUNTER (OUTPATIENT)
Dept: HOSPITAL 17 - PLAB | Age: 57
End: 2017-05-09
Attending: FAMILY MEDICINE
Payer: COMMERCIAL

## 2017-05-09 DIAGNOSIS — D64.9: Primary | ICD-10-CM

## 2017-05-09 LAB
ERYTHROCYTE [DISTWIDTH] IN BLOOD BY AUTOMATED COUNT: 17.8 % (ref 11.6–17.2)
HCT VFR BLD CALC: 32.6 % (ref 35–46)
MCH RBC QN AUTO: 25.5 PG (ref 27–34)
MCHC RBC AUTO-ENTMCNC: 31.7 % (ref 32–36)
MCV RBC AUTO: 80.5 FL (ref 80–100)
PLATELET # BLD: 397 TH/MM3 (ref 150–450)
RBC # BLD AUTO: 4.05 MIL/MM3 (ref 4–5.3)
REVIEW FLAG: (no result)
WBC # BLD AUTO: 8.7 TH/MM3 (ref 4–11)

## 2017-05-09 PROCEDURE — 85027 COMPLETE CBC AUTOMATED: CPT

## 2017-06-28 ENCOUNTER — HOSPITAL ENCOUNTER (OUTPATIENT)
Dept: HOSPITAL 17 - PLAB | Age: 57
End: 2017-06-28
Attending: INTERNAL MEDICINE
Payer: COMMERCIAL

## 2017-06-28 DIAGNOSIS — R63.5: ICD-10-CM

## 2017-06-28 DIAGNOSIS — R00.2: ICD-10-CM

## 2017-06-28 DIAGNOSIS — R53.83: Primary | ICD-10-CM

## 2017-06-28 LAB — T4 FREE SERPL-MCNC: 0.91 NG/DL (ref 0.76–1.46)

## 2017-06-28 PROCEDURE — 84439 ASSAY OF FREE THYROXINE: CPT

## 2017-06-28 PROCEDURE — 84443 ASSAY THYROID STIM HORMONE: CPT

## 2017-07-13 ENCOUNTER — HOSPITAL ENCOUNTER (INPATIENT)
Dept: HOSPITAL 17 - PHED | Age: 57
LOS: 3 days | Discharge: HOME | DRG: 392 | End: 2017-07-16
Attending: HOSPITALIST | Admitting: HOSPITALIST
Payer: COMMERCIAL

## 2017-07-13 VITALS
DIASTOLIC BLOOD PRESSURE: 67 MMHG | RESPIRATION RATE: 17 BRPM | HEART RATE: 80 BPM | OXYGEN SATURATION: 98 % | SYSTOLIC BLOOD PRESSURE: 148 MMHG

## 2017-07-13 VITALS
HEART RATE: 87 BPM | DIASTOLIC BLOOD PRESSURE: 97 MMHG | RESPIRATION RATE: 18 BRPM | SYSTOLIC BLOOD PRESSURE: 170 MMHG | TEMPERATURE: 98.5 F | OXYGEN SATURATION: 100 %

## 2017-07-13 VITALS
OXYGEN SATURATION: 96 % | DIASTOLIC BLOOD PRESSURE: 73 MMHG | RESPIRATION RATE: 18 BRPM | HEART RATE: 84 BPM | SYSTOLIC BLOOD PRESSURE: 131 MMHG

## 2017-07-13 VITALS
SYSTOLIC BLOOD PRESSURE: 170 MMHG | DIASTOLIC BLOOD PRESSURE: 77 MMHG | OXYGEN SATURATION: 98 % | HEART RATE: 94 BPM | RESPIRATION RATE: 18 BRPM

## 2017-07-13 VITALS
SYSTOLIC BLOOD PRESSURE: 147 MMHG | RESPIRATION RATE: 16 BRPM | OXYGEN SATURATION: 96 % | HEART RATE: 84 BPM | TEMPERATURE: 97.9 F | DIASTOLIC BLOOD PRESSURE: 69 MMHG

## 2017-07-13 VITALS — OXYGEN SATURATION: 100 %

## 2017-07-13 VITALS
OXYGEN SATURATION: 97 % | HEART RATE: 88 BPM | DIASTOLIC BLOOD PRESSURE: 85 MMHG | RESPIRATION RATE: 18 BRPM | SYSTOLIC BLOOD PRESSURE: 150 MMHG

## 2017-07-13 VITALS — OXYGEN SATURATION: 99 %

## 2017-07-13 VITALS — SYSTOLIC BLOOD PRESSURE: 138 MMHG | DIASTOLIC BLOOD PRESSURE: 68 MMHG

## 2017-07-13 VITALS — HEART RATE: 78 BPM

## 2017-07-13 VITALS — HEART RATE: 80 BPM

## 2017-07-13 VITALS — BODY MASS INDEX: 35.79 KG/M2 | HEIGHT: 66 IN | WEIGHT: 222.67 LBS

## 2017-07-13 DIAGNOSIS — R13.10: ICD-10-CM

## 2017-07-13 DIAGNOSIS — K22.4: Primary | ICD-10-CM

## 2017-07-13 DIAGNOSIS — K58.9: ICD-10-CM

## 2017-07-13 DIAGNOSIS — F41.9: ICD-10-CM

## 2017-07-13 DIAGNOSIS — K21.9: ICD-10-CM

## 2017-07-13 DIAGNOSIS — Z95.1: ICD-10-CM

## 2017-07-13 DIAGNOSIS — I10: ICD-10-CM

## 2017-07-13 DIAGNOSIS — Z86.73: ICD-10-CM

## 2017-07-13 DIAGNOSIS — E78.5: ICD-10-CM

## 2017-07-13 DIAGNOSIS — G25.81: ICD-10-CM

## 2017-07-13 DIAGNOSIS — Z82.49: ICD-10-CM

## 2017-07-13 DIAGNOSIS — M32.9: ICD-10-CM

## 2017-07-13 DIAGNOSIS — Z79.82: ICD-10-CM

## 2017-07-13 DIAGNOSIS — Z96.641: ICD-10-CM

## 2017-07-13 DIAGNOSIS — I25.110: ICD-10-CM

## 2017-07-13 DIAGNOSIS — M19.90: ICD-10-CM

## 2017-07-13 DIAGNOSIS — K31.84: ICD-10-CM

## 2017-07-13 DIAGNOSIS — J45.909: ICD-10-CM

## 2017-07-13 LAB
ANION GAP SERPL CALC-SCNC: 9 MEQ/L (ref 5–15)
APTT BLD: 27.5 SEC (ref 24.3–30.1)
BASOPHILS # BLD AUTO: 0 TH/MM3 (ref 0–0.2)
BASOPHILS NFR BLD: 0.3 % (ref 0–2)
BUN SERPL-MCNC: 13 MG/DL (ref 7–18)
CHLORIDE SERPL-SCNC: 106 MEQ/L (ref 98–107)
EOSINOPHIL # BLD: 0.1 TH/MM3 (ref 0–0.4)
EOSINOPHIL NFR BLD: 0.8 % (ref 0–4)
ERYTHROCYTE [DISTWIDTH] IN BLOOD BY AUTOMATED COUNT: 19.1 % (ref 11.6–17.2)
GFR SERPLBLD BASED ON 1.73 SQ M-ARVRAT: 64 ML/MIN (ref 89–?)
HCO3 BLD-SCNC: 26.7 MEQ/L (ref 21–32)
HCT VFR BLD CALC: 33.6 % (ref 35–46)
HEMO FLAGS: (no result)
INR PPP: 1 RATIO
LYMPHOCYTES # BLD AUTO: 2.3 TH/MM3 (ref 1–4.8)
LYMPHOCYTES NFR BLD AUTO: 24.2 % (ref 9–44)
MAGNESIUM SERPL-MCNC: 2 MG/DL (ref 1.5–2.5)
MCH RBC QN AUTO: 27.6 PG (ref 27–34)
MCHC RBC AUTO-ENTMCNC: 33.4 % (ref 32–36)
MCV RBC AUTO: 82.7 FL (ref 80–100)
MONOCYTES NFR BLD: 9.7 % (ref 0–8)
NEUTROPHILS # BLD AUTO: 6.3 TH/MM3 (ref 1.8–7.7)
NEUTROPHILS NFR BLD AUTO: 65 % (ref 16–70)
PLATELET # BLD: 353 TH/MM3 (ref 150–450)
POTASSIUM SERPL-SCNC: 3.2 MEQ/L (ref 3.5–5.1)
PROTHROMBIN TIME: 11.4 SEC (ref 9.8–11.6)
RBC # BLD AUTO: 4.07 MIL/MM3 (ref 4–5.3)
SODIUM SERPL-SCNC: 142 MEQ/L (ref 136–145)
WBC # BLD AUTO: 9.6 TH/MM3 (ref 4–11)

## 2017-07-13 PROCEDURE — 94664 DEMO&/EVAL PT USE INHALER: CPT

## 2017-07-13 PROCEDURE — 71275 CT ANGIOGRAPHY CHEST: CPT

## 2017-07-13 PROCEDURE — 93005 ELECTROCARDIOGRAM TRACING: CPT

## 2017-07-13 PROCEDURE — 84484 ASSAY OF TROPONIN QUANT: CPT

## 2017-07-13 PROCEDURE — 83690 ASSAY OF LIPASE: CPT

## 2017-07-13 PROCEDURE — 80053 COMPREHEN METABOLIC PANEL: CPT

## 2017-07-13 PROCEDURE — 85025 COMPLETE CBC W/AUTO DIFF WBC: CPT

## 2017-07-13 PROCEDURE — 85730 THROMBOPLASTIN TIME PARTIAL: CPT

## 2017-07-13 PROCEDURE — 85610 PROTHROMBIN TIME: CPT

## 2017-07-13 PROCEDURE — 71010: CPT

## 2017-07-13 PROCEDURE — 83880 ASSAY OF NATRIURETIC PEPTIDE: CPT

## 2017-07-13 PROCEDURE — 83735 ASSAY OF MAGNESIUM: CPT

## 2017-07-13 PROCEDURE — 94640 AIRWAY INHALATION TREATMENT: CPT

## 2017-07-13 PROCEDURE — C1769 GUIDE WIRE: HCPCS

## 2017-07-13 PROCEDURE — 96365 THER/PROPH/DIAG IV INF INIT: CPT

## 2017-07-13 PROCEDURE — 96375 TX/PRO/DX INJ NEW DRUG ADDON: CPT

## 2017-07-13 PROCEDURE — 80048 BASIC METABOLIC PNL TOTAL CA: CPT

## 2017-07-13 PROCEDURE — 96376 TX/PRO/DX INJ SAME DRUG ADON: CPT

## 2017-07-13 RX ADMIN — STANDARDIZED SENNA CONCENTRATE AND DOCUSATE SODIUM SCH TAB: 8.6; 5 TABLET, FILM COATED ORAL at 21:00

## 2017-07-13 RX ADMIN — MONTELUKAST SODIUM SCH MG: 10 TABLET, COATED ORAL at 21:00

## 2017-07-13 RX ADMIN — IPRATROPIUM BROMIDE SCH MG: 0.5 SOLUTION RESPIRATORY (INHALATION) at 20:35

## 2017-07-13 RX ADMIN — NITROGLYCERIN SCH MG: 0.4 TABLET SUBLINGUAL at 16:34

## 2017-07-13 RX ADMIN — HEPARIN SODIUM SCH MLS/HR: 10000 INJECTION, SOLUTION INTRAVENOUS at 18:02

## 2017-07-13 RX ADMIN — NITROGLYCERIN SCH MG: 0.4 TABLET SUBLINGUAL at 16:22

## 2017-07-13 RX ADMIN — Medication SCH ML: at 21:00

## 2017-07-13 RX ADMIN — ALBUTEROL SULFATE SCH MG: 1.25 SOLUTION RESPIRATORY (INHALATION) at 20:35

## 2017-07-13 RX ADMIN — NITROGLYCERIN SCH MG: 0.4 TABLET SUBLINGUAL at 16:27

## 2017-07-13 NOTE — RADRPT
EXAM DATE/TIME:  07/13/2017 18:44 

 

[facility] COMPARISON:     

No previous studies available for comparison.

 

 

INDICATIONS :     

Chest pain. Rule out embolism.

                      

 

IV CONTRAST:     

73 cc Omnipaque 350 (iohexol) IV 

 

 

RADIATION DOSE:     

19.38 CTDIvol (mGy) 

 

 

MEDICAL HISTORY :     

Cardiovascular disease. Hypertension. Hernia, hiatal.Pat, gastroparesis

 

SURGICAL HISTORY :      

CABG Cholecystectomy.Nissen fundiplcation, hernia repair

 

ENCOUNTER:      

Initial

 

ACUITY:      

1 day

 

PAIN SCALE:      

7/10

 

LOCATION:        

chest 

 

TECHNIQUE:     

Volumetric scanning of the chest was performed using a pulmonary embolism protocol MIP images were re
constructed.  Using automated exposure control and adjustment of the mA and/or kV according to patien
t size, radiation dose was kept as low as reasonably achievable to obtain optimal diagnostic quality 
images.   DICOM format image data is available electronically for review and comparison.  

 

Follow-up recommendations for incidentally detected pulmonary nodules are based at a minimum on nodul
e size and patient risk factors according to Fleischner Society Guidelines.

 

FINDINGS:     

The lungs are clear without infiltrate, nodule, or mass.  There is no pleural effusion.  No appreciab
le pathological adenopathy is seen within the mediastinum. There is no evidence for PE for technique.
 Coronary artery calcifications are seen typically seen with CAD and need to be evaluated clinically.


 

CONCLUSION:     There is no evidence for PE for technique.

 

 

 CECILY Tolbert MD on July 13, 2017 at 19:07           

Board Certified Radiologist.

 This report was verified electronically.

## 2017-07-13 NOTE — RADRPT
EXAM DATE/TIME:  07/13/2017 16:21 

 

[facility] COMPARISON:     

CHEST SINGLE AP, May 04, 2017, 16:20.

 

                     

INDICATIONS :     

Chest pain in center of chest.

                     

 

MEDICAL HISTORY :            

Hypertension. CVA. Cardiomyopathy. Coronary artery disease. Asthma.Lupus. Hiatal hernia.   

 

SURGICAL HISTORY :        

Appendectomy. Cholecystectomy. CABG. Loop recorder. Cardiac cath.Hysterectomy. Tubal ligation.

 

ENCOUNTER:     

Initial                                        

 

ACUITY:     

1 day      

 

PAIN SCORE:     

8/10

 

LOCATION:     

Bilateral chest 

 

FINDINGS:     

A single view of the chest demonstrates the lungs to be symmetrically aerated without evidence of mas
s, infiltrate or effusion.  The cardiomediastinal contours are unremarkable.  Osseous structures are 
intact. Median sternotomy wires.

 

CONCLUSION:     No acute disease.  

 

 

 

 Kolby Quintanilla Jr., MD on July 13, 2017 at 16:56           

Board Certified Radiologist.

 This report was verified electronically.

## 2017-07-13 NOTE — PD
HPI


Chief Complaint:  chest pain


Time Seen by Provider:  15:57


Travel History


International Travel<30 days:  No


Contact w/Intl Traveler<30days:  No


Traveled to known affect area:  No





History of Present Illness


HPI


This 57-year-old female is complaining of chest pain.  She says that around 9:

00 this morning she started having some sternal pressure.  It radiates to her 

arms and her neck.  She had a little bit of diaphoresis earlier.  He is 

nauseated.  She thought it might be indigestion and she has taken multiple 

stomach remedies without improvement.  She has a history of a Saul 

fundoplication and she has a history of coronary artery disease.  In 2013 she 

had coronary bypass surgery.  She has had chest pain since then and had a 

cardiac catheterization done on May 5.  This showed a proximal LAD stenosis of 

50-60%.  She did have occlusion of her left internal mammary gland graft.  At 

that time she was thought to be a candidate for medical management.  It was 

noted that she has a nickel allergy which makes stenting difficult.  She takes 

aspirin every day.  She was recently tapered off of atenolol because she is 

complaining of generalized weakness.  She does have a history of lupus.  She 

has never smoked.  She is having pain now.  The patient thought it might be of 

GI origin and she took repeated doses of Gaviscon without any relief.





PFSH


Past Medical History


Arthritis:  Yes (generalized throughout body)


Asthma:  Yes


Autoimmune Disease:  Yes (Lupus)


Anxiety:  Yes


Depression:  No


Heart Rhythm Problems:  No


Cancer:  Yes (PRE CANCEROUS UTERUS)


Cardiac Catheterization:  Yes


Cardiomyopathy:  Yes (CABG 2013)


Cardiovascular Problems:  Yes


High Cholesterol:  No


Chemotherapy:  No


Chest Pain:  No


Congestive Heart Failure:  No


COPD:  No


Cerebrovascular Accident:  Yes (OPEN HEART SURGERY)


Diabetes:  No


Diminished Hearing:  No


Endocrine:  No


Gastrointestinal Disorders:  Yes (IBS,GASTROPARESIS / NISSEN FUNDIPLICATION)


GERD:  Yes


Genitourinary:  No


Headaches:  No


Hepatitis:  No


Hiatal Hernia:  Yes (REPAIRED)


Hypertension:  Yes


Immune Disorder:  Yes (Lupus)


Kidney Stones:  No


Musculoskeletal:  Yes (LOWER BACK SPONDYLOSIS, OSTEOARTHRITIS)


Neurologic:  Yes (SEVERE MIGRAINES, RESTLESS LEG,TIA, REYNAUDS, NEURITIS)


Psychiatric:  No


Reproductive:  Yes


Respiratory:  Yes (ASTHMA)


Immunizations Current:  Yes


Migraines:  Yes


Radiation Therapy:  No


Renal Failure:  No


Seizures:  No


Sickle Cell Disease:  No


Sleep Apnea:  No


Thyroid Disease:  No


Ulcer:  Yes


PNEUMOCCOCAL Vaccine (Year):  2008


Menopausal:  Yes


Tubal Ligation:  Yes (1988)





Past Surgical History


Abdominal Surgery:  Yes (abdominal mass removed, CHOLY,APPY)


AICD:  No


Appendectomy:  Yes (1970)


Arteriovenous Shunt:  No


Body Medical Devices:  LOOP RECORDER FOR ARRYTHMIA, 4 PLATES, 24 SCREWS CHEST


Cardiac Surgery:  Yes ( CABGX1 OCT. 2013, LOOP RECORDER IMPLANTED 1/2015, 

CARDIAC CATH, CABG, )


Cholecystectomy:  Yes (1999)


Coronary Artery Bypass Graft:  Yes (x1)


Ear Surgery:  No


Endocrine Surgery:  No


Eye Surgery:  No


Genitourinary Surgery:  Yes ( bladder lift X2)


Gynecologic Surgery:  Yes (cyst removed 1981,UTERINE ABLATION TOTAL 

HYSTERECTOMY 1994)


Hysterectomy:  Yes


Insulin Pump:  No


Joint Replacement:  Yes (RIGHT HIP REPLACEMENT)


Neurologic Surgery:  Yes (L4, L5, L6)


Oral Surgery:  No


Pacemaker:  No


Thoracic Surgery:  Yes (06/2015 STERNAL WIRES REMOVED)


Other Surgery:  Yes (SEE OTHER FOR LIST)





Social History


Alcohol Use:  Yes (SOCIAL)


Tobacco Use:  No


Substance Use:  No





Allergies-Medications


(Allergen,Severity, Reaction):  


Coded Allergies:  


     Advair (Verified  Allergy, Severe, Shortness of Breath, 7/13/17)


     HMG-CoA Reductase Inhibitors (Unverified  Allergy, Severe, MUSCLE ACHES, 

JOINT PAIN, SENSITIVE SKIN, 7/13/17)


 FOGGY BRAIN


     Lovastatin (Verified  Allergy, Severe, COUGH, BREATHING DIFFICULTY, HEADACH

, 7/13/17)


     Norvasc (Verified  Allergy, Severe, RASH ON LEGS, EDEMA, 7/13/17)


     Penicillin (Verified  Allergy, Severe, Anaphylaxis, 7/13/17)


     Reglan (Unverified  Allergy, Severe, NEUROLOGICAL EFFECT, WORSENS RESTLEG 

AND CAUSES TREMOR, 7/13/17)


 NEUROLOGICAL EFFECT, WORSENS RESTLEG AND CAUSES TREMOR


     Adhesives (Verified  Allergy, Mild, RASH, 7/13/17)


 MAY USE PAPER TAPE ONLY AND TEGADERM


     Plaquenil (Verified  Adverse Reaction, Severe, MUSCLE ACHING, 7/13/17)


 VISUAL DISTURBANCES


     Lipitor (Verified  Adverse Reaction, Intermediate, NEURALGIA, 7/13/17)


Uncoded Allergies:  


     NICKEL (Allergy, Intermediate, RASH, BURN, 6/15/15)


 CONFIRMED 8/29/14 TM


     STERI STRIPS (Allergy, Intermediate, RASH, BURN, 6/15/15)


 CONFIRMED 8/29/14 TM


Reported Meds & Prescriptions





Reported Meds & Active Scripts


Active


Reported


Clindamycin (Clindamycin HCl) 300 Mg Cap 600 Mg PO ONCE PRN


Transderm-Scop (Scopolamine) 1 Mg/3 Days Dis 1 Patch T-DERMAL Q3D PRN


Dexamethasone 4 Mg Tab 4 Mg PO AS DIRECTED


Lidoderm (Lidocaine) 5 % Adh..patch 1 Patch T-DERMAL Q12HR PRN


Folic Acid 1 Mg Tablet 1 Mg PO HS


Tylenol (Acetaminophen) 325 Mg Tab 650 Mg PO Q4H PRN


Tylenol (Acetaminophen) 325 Mg Tab 650 Mg PO HS PRN


Aspirin Adult Low Strength (Aspirin) 81 Mg Tabdr 81 Mg PO DAILY


Restoril (Temazepam) 15 Mg Cap 15-30 Mg PO HS


Gabapentin 600 Mg Tab 600 Mg PO BID


Tessalon Perles (Benzonatate) 100 Mg Cap 200 Mg PO TID PRN


Zofran Odt (Ondansetron Odt) 8 Mg Tab 8 Mg SL Q8H PRN


Butorphanol Nasal Spray (Butorphanol Tartrate) 10 Mg/Ml Soln 1 Spray NASAL Q4HR 

PRN


     in one nostril (1 mg). If pain not reliefed in 60-90 minutes, a 1 mg


     repeat dose may be given.  May repeat this in 3-4 hrs if needed.


Epipen 2-Frank Inj (Epinephrine) 0.3 Mg/0.3 Ml Pfpen 0.3 Mg IM ONCE PRN


Xopenex Hfa 15 GM Inh (Levalbuterol 15 GM Inh) 45 Mcg/Act Aer 45 Mcg INH Q6HR


     Shake well before using. (1 puff = 45 mcg)


Xopenex Neb (Levalbuterol HCl) 0.63 Mg/3 Ml Neb 0.63 Mg NEB QID


Ipratropium Neb (Ipratropium Bromide) 0.5 Mg/2.5 Ml Amp 0.5 Mg NEB Q6HR NEB


Amlodipine (Amlodipine Besylate) 2.5 Mg Tab 2.5 Mg PO DAILY PRN


Singulair (Montelukast Sodium) 10 Mg Tab 10 Mg PO HS


Incruse Ellipta Inh (Umeclidinium Bromide Inh) 0.0625 Mg/Act Inh 1 Puff INH 

DAILY


Methotrexate 2.5 Mg Tab 15 Mg PO WEEKLY


Tramadol (Tramadol HCl) 50 Mg Tab 50 Mg PO QID


Atenolol 50 Mg Tab 50 Mg PO HS


Cartia Xt (Diltiazem ER 24 HR) 120 Mg Caper 120 Mg PO DAILY


Torsemide 20 Mg Tab 20 Mg PO DAILY


Azithromycin 250 Mg Tab 250 Mg PO HS


     On for 1 week, off for 1 week


Protonix (Pantoprazole Sodium) 40 Mg Tab 40 Mg PO HS








Review of Systems


General / Constitutional:  No: Fever, Chills


Eyes:  No: Diploplia


Cardiovascular:  Positive: Chest Pain or Discomfort,  No: Palpitations, Syncope


Respiratory:  Positive: Shortness of Breath


Gastrointestinal:  Positive: Nausea


Genitourinary:  No: Urgency, Frequency


Musculoskeletal:  Positive: Arthralgias,  No: Myalgias


Skin:  No Rash


Neurologic:  Positive: Weakness


Endocrine:  No: Heat Intolerance, Cold Intolerance


Hematologic/Lymphatic:  No: Easy Bruising





Physical Exam


Narrative


GENERAL: Well-developed female


SKIN: Focused skin assessment warm/dry.


HEAD: Atraumatic. Normocephalic. 


EYES: Pupils equal and round. No scleral icterus. No injection or drainage. 


ENT: No nasal bleeding or discharge.  Mucous membranes pink and moist.


NECK: Trachea midline. No JVD. 


CARDIOVASCULAR: Regular rate and rhythm.  No murmur appreciated.


RESPIRATORY: No accessory muscle use. Clear to auscultation. Breath sounds 

equal bilaterally. 


GASTROINTESTINAL: Abdomen soft, non-tender, nondistended. Hepatic and splenic 

margins not palpable. 


MUSCULOSKELETAL: No obvious deformities. No clubbing.  No cyanosis.  No edema. 


NEUROLOGICAL: Awake and alert. No obvious cranial nerve deficits.  Motor 

grossly within normal limits. Normal speech.


PSYCHIATRIC: Appropriate mood and affect; insight and judgment normal.





Data


Data


Last Documented VS





Vital Signs








  Date Time  Temp Pulse Resp B/P Pulse Ox O2 Delivery O2 Flow Rate FiO2


 


7/13/17 16:43  88 18 150/85 97 Room Air  


 


7/13/17 16:00 98.5       








Orders





 Electrocardiogram (7/13/17 16:06)


Basic Metabolic Panel (Bmp) (7/13/17 16:06)


B-Type Natriuretic Peptide (7/13/17 16:06)


Complete Blood Count With Diff (7/13/17 16:06)


Magnesium (Mg) (7/13/17 16:06)


Prothrombin Time / Inr (Pt) (7/13/17 16:06)


Act Partial Throm Time (Ptt) (7/13/17 16:06)


Troponin I (7/13/17 16:06)


Chest, Single Ap (7/13/17 16:06)


Ecg Monitoring (7/13/17 16:06)


Iv Access Insert/Monitor (7/13/17 16:06)


Oximetry (7/13/17 16:06)


Nitroglycerin 2% Oint (Nitroglycerin 2% (7/13/17 16:15)


Sodium Chloride 0.9% Flush (Ns Flush) (7/13/17 16:15)


Nitroglycerin Sl (Nitrostat Sl) (7/13/17 16:15)


Ondansetron Inj (Zofran Inj) (7/13/17 16:15)


Morphine Inj (Morphine Inj) (7/13/17 16:30)


Potassium Chloride (Kcl) (7/13/17 16:45)


Nitroglycerin-Dextrose Inj (Nitroglyceri (7/13/17 16:45)


Morphine Inj (Morphine Inj) (7/13/17 17:00)


Morphine Inj (Morphine Inj) (7/13/17 17:15)


Ct Pulmonary Angiogram (7/13/17 17:21)


Heparin Infusion DENNIS.Q1H (7/13/17 17:21)


Heparin Inj (Heparin Inj) (7/13/17 17:30)


Heparin Inj (Heparin Inj) (7/13/17 23:30)


Heparin Inj (Heparin Inj) (7/13/17 23:30)


Heparin-D5w Inj (Heparin-D5w Inj) (7/13/17 17:30)


Act Partial Throm Time (Ptt) (7/13/17 17:21)


Cbc No Diff, Includes Plts (7/13/17 17:21)


Cbc No Diff, Includes Plts (7/16/17 06:00)


Act Partial Throm Time (Ptt) (7/14/17 00:21)


Occult Blood (Hemoccult) Stool (7/13/17 17:21)


Admit Order (Ed Use Only) (7/13/17 17:36)


Admit To Inpatient (7/13/17 )


Vital Signs (Adult) Q4H (7/13/17 17:35)


Cardiac Monitor / Telemetry .CONTINUOUS (7/13/17 17:35)


Intake + Output DENNIS.QSHIFT (7/13/17 17:35)


Sodium Chloride 0.9% Flush (Ns Flush) (7/13/17 17:45)


Sodium Chloride 0.9% Flush (Ns Flush) (7/13/17 21:00)


Acetaminophen (Tylenol) (7/13/17 17:45)


Ondansetron Inj (Zofran Inj) (7/13/17 17:45)


Prochlorperazine Supp (Compazine Supp) (7/13/17 17:45)


Comprehensive Metabolic Panel (7/14/17 06:00)


Complete Blood Count With Diff (7/14/17 06:00)


Troponin I (7/13/17 17:35)


Troponin I (7/13/17 23:35)


Electrocardiogram (7/13/17 17:45)


Electrocardiogram (7/13/17 23:45)


Resp Oxygen Romero C Titrat 1-4 L (7/13/17 )


Case Management Consult (7/13/17 17:35)


Enoxaparin Inj (Lovenox Inj) (7/13/17 18:00)


Scd Bilateral/Knee High DENNIS.BID (7/13/17 17:35)


Akshat Bilateral/Knee High DENNIS.QSHIFT (7/13/17 17:35)


Docusate Sodium-Senna (Oliva-Colace) (7/13/17 21:00)


Magnesium Hydroxide Liq (Milk Of Magnesi (7/13/17 17:45)


Sennosides (Senokot) (7/13/17 17:45)


Bisacodyl Supp (Dulcolax Supp) (7/13/17 17:45)


Lactulose Liq (Lactulose Liq) (7/13/17 17:45)


Inpatient Certification (7/13/17 )


^ Medication Reconciliation (7/13/17 17:38)





Labs





 Laboratory Tests








Test 7/13/17





 16:20


 


White Blood Count 9.6 TH/MM3


 


Red Blood Count 4.07 MIL/MM3


 


Hemoglobin 11.2 GM/DL


 


Hematocrit 33.6 %


 


Mean Corpuscular Volume 82.7 FL


 


Mean Corpuscular Hemoglobin 27.6 PG


 


Mean Corpuscular Hemoglobin 33.4 %





Concent 


 


Red Cell Distribution Width 19.1 %


 


Platelet Count 353 TH/MM3


 


Mean Platelet Volume 8.7 FL


 


Neutrophils (%) (Auto) 65.0 %


 


Lymphocytes (%) (Auto) 24.2 %


 


Monocytes (%) (Auto) 9.7 %


 


Eosinophils (%) (Auto) 0.8 %


 


Basophils (%) (Auto) 0.3 %


 


Neutrophils # (Auto) 6.3 TH/MM3


 


Lymphocytes # (Auto) 2.3 TH/MM3


 


Monocytes # (Auto) 0.9 TH/MM3


 


Eosinophils # (Auto) 0.1 TH/MM3


 


Basophils # (Auto) 0.0 TH/MM3


 


CBC Comment DIFF FINAL 


 


Differential Comment  


 


Prothrombin Time 11.4 SEC


 


Prothromb Time International 1.0 RATIO





Ratio 


 


Activated Partial 27.5 SEC





Thromboplast Time 


 


Sodium Level 142 MEQ/L


 


Potassium Level 3.2 MEQ/L


 


Chloride Level 106 MEQ/L


 


Carbon Dioxide Level 26.7 MEQ/L


 


Anion Gap 9 MEQ/L


 


Blood Urea Nitrogen 13 MG/DL


 


Creatinine 0.91 MG/DL


 


Estimat Glomerular Filtration 64 ML/MIN





Rate 


 


Random Glucose 102 MG/DL


 


Calcium Level 8.5 MG/DL


 


Magnesium Level 2.0 MG/DL


 


Troponin I LESS THAN 0.02





 NG/ML


 


B-Type Natriuretic Peptide 36 PG/ML











MDM


Medical Decision Making


Medical Screen Exam Complete:  Yes


Emergency Medical Condition:  Yes


Medical Record Reviewed:  Yes


Differential Diagnosis


Differential includes unstable angina, and STEMI, noncardiac chest pain


Narrative Course


Repeat EKG is similar to previous tracings.  She has sinus rhythm.  There are 

nonspecific ST and T wave abnormalities which have been present on previous 

EKGs.  Her troponin is normal.  She was given repeated nitros which seemed to 

help the pain but did not eliminate it.  She still reporting pain at a level of 

5 after 3 nitros.  She will be started on a nitroglycerin drip as well as a 

heparin drip.  I have spoken with Dr. Anders our request she be transported her 

to the [facility] hospital.  He does also request that we do a CTA to make sure this 

is not pain related to a pulmonary embolus.  CTA has been done and is negative 

for pulmonary embolus





Diagnosis





 Primary Impression:  


 Unstable angina





Admitting Information


Admitting Physician Requests:  Admit








Ethan De Leon MD Jul 13, 2017 16:16

## 2017-07-13 NOTE — HHI.HP
__________________________________________________





[facility]


Service


[facility]


Primary Care Physician


Terry Casiano MD


Admission Diagnosis


UNSTABLE ANGINA


Diagnoses:  


(1) Unstable angina


Chief Complaint:  


Chest pain


Travel History


International Travel<30 Days:  No


Contact w/Intl Traveler <30 Da:  No


Traveled to Known Affected Are:  No


History of Present Illness


Written by Katherin Mahoney, acting as scribe for Dr. Mercado on 7/13/17 at 22:53. 





Centrally located chest pain described as pressure - took tums and gaviscon 

without relief, radiated to left side of neck and down left arm.  Accompanied 

by nausea and diaphoresis.  She was short of breath with exertion.  The pain 

never went away.  She is currently having pain but it is currently down from a 

9 to a 5.  She states it was helped with nitroglycerin; she is currently on a 

nitro drip.  Atenolol was discontinued and fatigue, edema, and shortness of 

breath associated with the medication improved.  





She reports a history of SVT and states her heart will skip a beat at times.  

When she coughs, it stops. 


Denies fever, n/v/d, dysuria, hematuria, black or red stool, abdominal pain, or 

cough.





Cardiologist is Dr. Fletcher





Past Family Social History


Past Medical History


Coronary artery disease status post CABG 2013 


Asthma 


IBS 


Gastroesophageal reflux disease 


Repaired hiatal hernia 


Hypertension 


Lower back spondylosis 


Osteoarthritis 


Restless leg syndrome 


TIA 


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 


Neuritis 


Gastroparesis/Nissen fundoplication 


Arthritis 


Asthma 


Lupus 


Anxiety 


Migraine headaches 


Pre-cancerous uterine changes 





Denies CHF, hypertension, diabetes mellitus, liver problems, kidney problems, 

DVT, PE, seizures, thyroid problems, or cancers.


.


Past Surgical History


Bilateral tubal ligation 1988 


Cholecystectomy 


Appendectomy 1970 


Abdominal mass extraction 


Loop recorder for arrhythmia 1/2015 


CABG times 1/2013 


Bladder repair 


Uterine ablation 


Total hysterectomy 1994 


Right hip replacement 


L4 - L6 spinal surgery


Reported Medications





Reported Meds & Active Scripts


Active


Reported


Clindamycin (Clindamycin HCl) 300 Mg Cap 600 Mg PO ONCE PRN


Transderm-Scop (Scopolamine) 1 Mg/3 Days Dis 1 Patch T-DERMAL Q3D PRN


Dexamethasone 4 Mg Tab 4 Mg PO AS DIRECTED


Lidoderm (Lidocaine) 5 % Adh..patch 1 Patch T-DERMAL Q12HR PRN


Folic Acid 1 Mg Tablet 1 Mg PO HS


Tylenol (Acetaminophen) 325 Mg Tab 650 Mg PO Q4H PRN


Tylenol (Acetaminophen) 325 Mg Tab 650 Mg PO HS PRN


Aspirin Adult Low Strength (Aspirin) 81 Mg Tabdr 81 Mg PO DAILY


Restoril (Temazepam) 15 Mg Cap 15-30 Mg PO HS


Gabapentin 600 Mg Tab 600 Mg PO BID


Tessalon Perles (Benzonatate) 100 Mg Cap 200 Mg PO TID PRN


Zofran Odt (Ondansetron Odt) 8 Mg Tab 8 Mg SL Q8H PRN


Butorphanol Nasal Spray (Butorphanol Tartrate) 10 Mg/Ml Soln 1 Spray NASAL Q4HR 

PRN


     in one nostril (1 mg). If pain not reliefed in 60-90 minutes, a 1 mg


     repeat dose may be given.  May repeat this in 3-4 hrs if needed.


Epipen 2-Frank Inj (Epinephrine) 0.3 Mg/0.3 Ml Pfpen 0.3 Mg IM ONCE PRN


Xopenex Hfa 15 GM Inh (Levalbuterol 15 GM Inh) 45 Mcg/Act Aer 45 Mcg INH Q6HR


     Shake well before using. (1 puff = 45 mcg)


Xopenex Neb (Levalbuterol HCl) 0.63 Mg/3 Ml Neb 0.63 Mg NEB QID


Ipratropium Neb (Ipratropium Bromide) 0.5 Mg/2.5 Ml Amp 0.5 Mg NEB Q6HR NEB


Amlodipine (Amlodipine Besylate) 2.5 Mg Tab 2.5 Mg PO DAILY PRN


Singulair (Montelukast Sodium) 10 Mg Tab 10 Mg PO HS


Incruse Ellipta Inh (Umeclidinium Bromide Inh) 0.0625 Mg/Act Inh 1 Puff INH 

DAILY


Methotrexate 2.5 Mg Tab 15 Mg PO WEEKLY


Tramadol (Tramadol HCl) 50 Mg Tab 50 Mg PO QID


Atenolol 50 Mg Tab 50 Mg PO HS


Cartia Xt (Diltiazem ER 24 HR) 120 Mg Caper 120 Mg PO DAILY


Torsemide 20 Mg Tab 20 Mg PO DAILY


Azithromycin 250 Mg Tab 250 Mg PO HS


     On for 1 week, off for 1 week


Protonix (Pantoprazole Sodium) 40 Mg Tab 40 Mg PO HS


.


Allergies:  


Coded Allergies:  


     Advair (Verified  Allergy, Severe, Shortness of Breath, 7/13/17)


     HMG-CoA Reductase Inhibitors (Unverified  Allergy, Severe, MUSCLE ACHES, 

JOINT PAIN, SENSITIVE SKIN, 7/13/17)


 FOGGY BRAIN


     Lovastatin (Verified  Allergy, Severe, COUGH, BREATHING DIFFICULTY, HEADACH

, 7/13/17)


     Norvasc (Verified  Allergy, Severe, RASH ON LEGS, EDEMA, 7/13/17)


     Penicillin (Verified  Allergy, Severe, Anaphylaxis, 7/13/17)


     Reglan (Unverified  Allergy, Severe, NEUROLOGICAL EFFECT, WORSENS RESTLEG 

AND CAUSES TREMOR, 7/13/17)


 NEUROLOGICAL EFFECT, WORSENS RESTLEG AND CAUSES TREMOR


     Adhesives (Verified  Allergy, Mild, RASH, 7/13/17)


 MAY USE PAPER TAPE ONLY AND TEGADERM


     Plaquenil (Verified  Adverse Reaction, Severe, MUSCLE ACHING, 7/13/17)


 VISUAL DISTURBANCES


     Lipitor (Verified  Adverse Reaction, Intermediate, NEURALGIA, 7/13/17)


Uncoded Allergies:  


     NICKEL (Allergy, Intermediate, RASH, BURN, 6/15/15)


 CONFIRMED 8/29/14 TM


     STERI STRIPS (Allergy, Intermediate, RASH, BURN, 6/15/15)


 CONFIRMED 8/29/14 TM


Active Ordered Medications





 Current Medications


Nitroglycerin (Nitroglycerin 2% Oint) 1 inch ONCE  ONCE TOP  Last administered 

on 7/13/17at 16:26;  Start 7/13/17 at 16:15;  Stop 7/13/17 at 16:18;  Status DC


Sodium Chloride (NS Flush) 2 ml UNSCH  PRN IVF FLUSH AFTER USING IV ACCESS;  

Start 7/13/17 at 16:15;  Stop 7/13/17 at 17:44;  Status DC


Nitroglycerin (Nitrostat Sl) 0.4 mg Q5M SL  Last administered on 7/13/17at 16:34

;  Start 7/13/17 at 16:15;  Stop 7/13/17 at 16:26;  Status DC


Ondansetron HCl (Zofran Inj) 4 mg ONCE  ONCE IV PUSH  Last administered on 7/13/ 17at 16:23;  Start 7/13/17 at 16:15;  Stop 7/13/17 at 16:18;  Status DC


Morphine Sulfate (Morphine Inj) 4 mg ONCE  ONCE IV PUSH  Last administered on 7/ 13/17at 16:24;  Start 7/13/17 at 16:30;  Stop 7/13/17 at 16:31;  Status DC


Potassium Chloride 30 meq 30 meq ONCE  ONCE PO  Last administered on 7/13/17at 

17:04;  Start 7/13/17 at 16:45;  Stop 7/13/17 at 16:46;  Status DC


Nitroglycerin/ Dextrose (Nitroglycerin-Dextrose Inj) 250 ml @ 0 mls/hr TITRATE  

ONCE IV  Last administered on 7/13/17at 17:02;  Start 7/13/17 at 16:45;  Stop 7/ 13/17 at 16:46;  Status DC


Morphine Sulfate (Morphine Inj) 4 mg ONCE  ONCE IV PUSH ;  Start 7/13/17 at 17:

00;  Stop 7/13/17 at 17:01;  Status DC


Morphine Sulfate (Morphine Inj) 4 mg ONCE  ONCE IV PUSH  Last administered on 7/ 13/17at 17:15;  Start 7/13/17 at 17:15;  Stop 7/13/17 at 17:16;  Status DC


Heparin Sodium (Porcine) (Heparin Inj) 4,000 units ONCE  ONCE IV  Last 

administered on 7/13/17at 18:03;  Start 7/13/17 at 17:30;  Stop 7/13/17 at 17:31

;  Status DC


Heparin Sodium (Porcine) (Heparin Inj) 5,000 units UNSCH  PRN IV APTT LESS THAN 

25;  Start 7/13/17 at 23:30


Heparin Sodium (Porcine) 2500 units 2,500 units UNSCH  PRN IV APTT 25 TO 39;  

Start 7/13/17 at 23:30


Heparin Sodium/ Dextrose (Heparin-D5W Inj) 250 ml @ 0 mls/hr TITRATE IV  Last 

administered on 7/13/17at 18:02;  Start 7/13/17 at 17:30


Sodium Chloride (NS Flush) 2 ml UNSCH  PRN IV FLUSH FLUSH AFTER USING IV ACCESS

;  Start 7/13/17 at 17:45


Sodium Chloride (NS Flush) 2 ml BID IV FLUSH ;  Start 7/13/17 at 21:00


Acetaminophen (Tylenol) 650 mg Q4H  PRN PO TEMP > 100.4;  Start 7/13/17 at 17:45


Ondansetron HCl (Zofran Inj) 4 mg Q6H  PRN IVP NAUSEA OR VOMITING;  Start 7/13/ 17 at 17:45


Prochlorperazine (Compazine Supp) 25 mg Q12H  PRN RECTAL NAUSEA OR VOMITING;  

Start 7/13/17 at 17:45


Enoxaparin Sodium (Lovenox Inj) 40 mg Q24H SQ ;  Start 7/13/17 at 18:00;  Stop 7 /13/17 at 18:08;  Status DC


Senna/Docusate Sodium (Oliva-Colace) 1 tab BID PO ;  Start 7/13/17 at 21:00


Magnesium Hydroxide (Milk Of Magnesia Liq) 30 ml Q12H  PRN PO MILD - MODERATE 

CONSTIPATION;  Start 7/13/17 at 17:45


Sennosides (Senokot) 17.2 mg Q12H  PRN PO MODERATE - SEVERE CONSTIPATION;  

Start 7/13/17 at 17:45


Bisacodyl (Dulcolax Supp) 10 mg DAILY  PRN RECTAL SEVERE CONSITIPATION;  Start 7 /13/17 at 17:45


Lactulose (Lactulose Liq) 30 ml DAILY  PRN PO SEVERE CONSITIPATION;  Start 7/13/ 17 at 17:45


Morphine Sulfate (Morphine Inj) 2 mg Q4H  PRN IV PUSH CHEST PAIN;  Start 7/13/ 17 at 18:00


Acetaminophen (Tylenol) 650 mg ONCE  ONCE PO  Last administered on 7/13/17at 18:

24;  Start 7/13/17 at 18:30;  Stop 7/13/17 at 18:31;  Status DC


Amlodipine Besylate (Norvasc) 2.5 mg DAILY  PRN PO SBP>160, DBP>90;  Start 7/13/ 17 at 18:45;  Status UNV


Aspirin (Ecotrin Ec) 81 mg DAILY PO ;  Start 7/14/17 at 09:00


Atenolol (Tenormin) 50 mg HS PO ;  Start 7/13/17 at 21:00


Butorphanol Tartrate (Stadol Romero Spr) 1 spray Q4HR  PRN NASAL Pain Management;  

Start 7/13/17 at 18:45


Diltiazem HCl (Cardizem Cd) 120 mg DAILY PO ;  Start 7/14/17 at 09:00


Folic Acid (Folate) 1 mg HS PO ;  Start 7/13/17 at 21:00


Gabapentin (Neurontin) 600 mg BID PO ;  Start 7/13/17 at 21:00


Ipratropium Bromide (Atrovent Neb) 0.5 mg Q6HR  NEB NEB  Last administered on 7/ 13/17at 20:35;  Start 7/13/17 at 22:00


Montelukast Sodium (Singulair) 10 mg HS PO ;  Start 7/13/17 at 21:00


Pantoprazole Sodium (Protonix) 40 mg HS PO ;  Start 7/13/17 at 21:00


Albuterol Sulfate (Albuterol Neb) 1.25 mg QID  NEB NEB  Last administered on 7/ 13/17at 20:35;  Start 7/13/17 at 20:00


Patient Own Medication PT OWN MED:(Umeclidinium Brom... DAILY INH ;  Start 7/14/ 17 at 09:00;  Status Future Hold


Iohexol (Omnipaque 350 Inj) 73 ml STK-MED ONCE IV ;  Start 7/13/17 at 19:14;  

Stop 7/13/17 at 19:15;  Status DC


.


Family History


Father with brain cancer, hypertension


Mother with CVA, hypertension


Sister with CHF, migraines


.


Social History


Tobacco: never though was exposed to second hand smoke


Alcohol: rare social use


Illicit Drugs: denies


.





Physical Exam


Vital Signs





 Vital Signs








  Date Time  Temp Pulse Resp B/P Pulse Ox O2 Delivery O2 Flow Rate FiO2


 


7/13/17 20:39     100 Nasal Cannula 2.00 


 


7/13/17 19:41  82 17 138/68    


 


7/13/17 19:15     98 Nasal Cannula 2.00 


 


7/13/17 19:10  80 17 148/67 98 Nasal Cannula 2 


 


7/13/17 19:07     99 Nasal Cannula 2.00 


 


7/13/17 18:08  84 18 131/73 96 Nasal Cannula 2 


 


7/13/17 16:43  88 18 150/85 97 Room Air  


 


7/13/17 16:35  94 18 170/77 98 Room Air  


 


7/13/17 16:00  87   100 Room Air  


 


7/13/17 16:00     100 Room Air  


 


7/13/17 16:00 98.5 87 18 170/97 100   








Physical Exam





GENERAL: This is a female patient, in no apparent distress.


SKIN: No rashes, ecchymoses or lesions. Cool and dry.


HEAD: Atraumatic. Normocephalic. 


EYES: No scleral icterus. No injection or drainage. 


ENT: Nose without bleeding, purulent drainage. 


NECK: Trachea midline. No JVD. 


CARDIOVASCULAR: Regular rate and rhythm without murmurs, gallops, or rubs. 


RESPIRATORY: Clear to auscultation. Breath sounds equal bilaterally. No wheezes

, rales, or rhonchi.  


GASTROINTESTINAL: Abdomen soft, non-tender, nondistended. No guarding.


MUSCULOSKELETAL: Extremities without clubbing, cyanosis, or edema. No calf 

tenderness. 


NEUROLOGICAL: Awake and alert. Motor and sensory grossly within normal limits. 

Normal speech.


.


Laboratory





Laboratory Tests








Test 7/13/17





 16:20


 


White Blood Count 9.6 


 


Red Blood Count 4.07 


 


Hemoglobin 11.2 


 


Hematocrit 33.6 


 


Mean Corpuscular Volume 82.7 


 


Mean Corpuscular Hemoglobin 27.6 


 


Mean Corpuscular Hemoglobin 33.4 





Concent 


 


Red Cell Distribution Width 19.1 


 


Platelet Count 353 


 


Mean Platelet Volume 8.7 


 


Neutrophils (%) (Auto) 65.0 


 


Lymphocytes (%) (Auto) 24.2 


 


Monocytes (%) (Auto) 9.7 


 


Eosinophils (%) (Auto) 0.8 


 


Basophils (%) (Auto) 0.3 


 


Neutrophils # (Auto) 6.3 


 


Lymphocytes # (Auto) 2.3 


 


Monocytes # (Auto) 0.9 


 


Eosinophils # (Auto) 0.1 


 


Basophils # (Auto) 0.0 


 


CBC Comment DIFF FINAL 


 


Differential Comment  


 


Prothrombin Time 11.4 


 


Prothromb Time International 1.0 





Ratio 


 


Activated Partial 27.5 





Thromboplast Time 


 


Sodium Level 142 


 


Potassium Level 3.2 


 


Chloride Level 106 


 


Carbon Dioxide Level 26.7 


 


Anion Gap 9 


 


Blood Urea Nitrogen 13 


 


Creatinine 0.91 


 


Estimat Glomerular Filtration 64 





Rate 


 


Random Glucose 102 


 


Calcium Level 8.5 


 


Magnesium Level 2.0 


 


Troponin I LESS THAN 0.02 


 


B-Type Natriuretic Peptide 36 








Result Diagram:  


7/13/17 1620                                                                   

             7/13/17 1620





Imaging





Last Impressions








CT Angiography 7/13/17 1721 Signed





Impressions: 





 Service Date/Time:  Thursday, July 13, 2017 18:44 - CONCLUSION: There is no 





 evidence for PE for technique.    K. Ethan Tolbert MD 


 


Chest X-Ray 7/13/17 1606 Signed





Impressions: 





 Service Date/Time:  Thursday, July 13, 2017 16:21 - CONCLUSION: No acute 





 disease.       Kolby Quintanilla Jr., MD 





.





Assessment and Plan


Problem List:  


(1) Unstable angina


ICD Code:  I20.0


Status:  Acute


Assessment and Plan


58 y/o female presented to [facility] complaining of chest pain. CTA was done 

and was negative for PE. The patient is transferred to the main campus for 

further management and evaluation. 





Unstable Angina 


- Nitroglycerin drip 


- Heparin drip 


- Continuous cardiac telemetry to monitor for arrhythmia 


- Monitor intake and output q shift 


- Serial cardiac enzymes and EKG 


- Monitor vital signs q4h 


- Morphine 2 mg IV q4h PRN chest pain 





DVT prophylaxis 


- Heparin gtt 


.


Discussed Condition With


ER physician, patient, and RN





Physician Certification


2 Midnight Certification Type:  Admission for Inpatient Services


Order for Inpatient Services


The services are ordered in accordance with Medicare regulations or non-

Medicare payer requirements, as applicable.  In the case of services not 

specified as inpatient-only, they are appropriately provided as inpatient 

services in accordance with the 2-midnight benchmark.


Estimated LOS (days):  3


 days is the estimated time the patient will need to remain in the hospital, 

assuming treatment plan goals are met and no additional complications.


Post-Hospital Plan:  Home








Katherin Mahoney Jul 13, 2017 22:54

## 2017-07-14 VITALS
DIASTOLIC BLOOD PRESSURE: 59 MMHG | HEART RATE: 66 BPM | SYSTOLIC BLOOD PRESSURE: 139 MMHG | RESPIRATION RATE: 16 BRPM | OXYGEN SATURATION: 93 % | TEMPERATURE: 98.2 F

## 2017-07-14 VITALS
TEMPERATURE: 98.5 F | HEART RATE: 80 BPM | OXYGEN SATURATION: 97 % | RESPIRATION RATE: 16 BRPM | DIASTOLIC BLOOD PRESSURE: 86 MMHG | SYSTOLIC BLOOD PRESSURE: 146 MMHG

## 2017-07-14 VITALS
RESPIRATION RATE: 19 BRPM | TEMPERATURE: 97.7 F | SYSTOLIC BLOOD PRESSURE: 146 MMHG | OXYGEN SATURATION: 98 % | DIASTOLIC BLOOD PRESSURE: 78 MMHG | HEART RATE: 75 BPM

## 2017-07-14 VITALS — HEART RATE: 80 BPM

## 2017-07-14 VITALS
DIASTOLIC BLOOD PRESSURE: 82 MMHG | RESPIRATION RATE: 20 BRPM | OXYGEN SATURATION: 100 % | SYSTOLIC BLOOD PRESSURE: 153 MMHG | HEART RATE: 75 BPM | TEMPERATURE: 97.9 F

## 2017-07-14 VITALS — HEART RATE: 72 BPM

## 2017-07-14 VITALS — HEART RATE: 62 BPM

## 2017-07-14 VITALS — HEART RATE: 66 BPM

## 2017-07-14 VITALS
HEART RATE: 79 BPM | OXYGEN SATURATION: 99 % | DIASTOLIC BLOOD PRESSURE: 86 MMHG | TEMPERATURE: 97.5 F | SYSTOLIC BLOOD PRESSURE: 150 MMHG | RESPIRATION RATE: 17 BRPM

## 2017-07-14 VITALS — HEART RATE: 70 BPM

## 2017-07-14 VITALS — HEART RATE: 79 BPM

## 2017-07-14 VITALS — HEART RATE: 74 BPM

## 2017-07-14 VITALS — HEART RATE: 65 BPM

## 2017-07-14 VITALS
DIASTOLIC BLOOD PRESSURE: 80 MMHG | OXYGEN SATURATION: 91 % | RESPIRATION RATE: 16 BRPM | SYSTOLIC BLOOD PRESSURE: 140 MMHG | HEART RATE: 87 BPM | TEMPERATURE: 97.9 F

## 2017-07-14 VITALS — HEART RATE: 73 BPM

## 2017-07-14 VITALS — HEART RATE: 76 BPM

## 2017-07-14 VITALS — OXYGEN SATURATION: 99 %

## 2017-07-14 VITALS — HEART RATE: 82 BPM

## 2017-07-14 VITALS — OXYGEN SATURATION: 95 %

## 2017-07-14 VITALS — HEART RATE: 78 BPM

## 2017-07-14 VITALS — HEART RATE: 68 BPM

## 2017-07-14 LAB
ALP SERPL-CCNC: 90 U/L (ref 45–117)
ALT SERPL-CCNC: 23 U/L (ref 10–53)
ANION GAP SERPL CALC-SCNC: 9 MEQ/L (ref 5–15)
APTT BLD: 31.3 SEC (ref 24.3–30.1)
APTT BLD: 33.8 SEC (ref 24.3–30.1)
APTT BLD: 34.9 SEC (ref 24.3–30.1)
APTT BLD: 36 SEC (ref 24.3–30.1)
AST SERPL-CCNC: 16 U/L (ref 15–37)
BASOPHILS # BLD AUTO: 0 TH/MM3 (ref 0–0.2)
BASOPHILS NFR BLD: 0.2 % (ref 0–2)
BILIRUB SERPL-MCNC: 0.3 MG/DL (ref 0.2–1)
BUN SERPL-MCNC: 12 MG/DL (ref 7–18)
CHLORIDE SERPL-SCNC: 108 MEQ/L (ref 98–107)
EOSINOPHIL # BLD: 0.1 TH/MM3 (ref 0–0.4)
EOSINOPHIL NFR BLD: 1.5 % (ref 0–4)
ERYTHROCYTE [DISTWIDTH] IN BLOOD BY AUTOMATED COUNT: 20 % (ref 11.6–17.2)
GFR SERPLBLD BASED ON 1.73 SQ M-ARVRAT: 80 ML/MIN (ref 89–?)
HCO3 BLD-SCNC: 27.3 MEQ/L (ref 21–32)
HCT VFR BLD CALC: 31.3 % (ref 35–46)
HEMO FLAGS: (no result)
LYMPHOCYTES # BLD AUTO: 2.2 TH/MM3 (ref 1–4.8)
LYMPHOCYTES NFR BLD AUTO: 27.6 % (ref 9–44)
MCH RBC QN AUTO: 27.2 PG (ref 27–34)
MCHC RBC AUTO-ENTMCNC: 32 % (ref 32–36)
MCV RBC AUTO: 85.1 FL (ref 80–100)
MONOCYTES NFR BLD: 10.3 % (ref 0–8)
NEUTROPHILS # BLD AUTO: 4.8 TH/MM3 (ref 1.8–7.7)
NEUTROPHILS NFR BLD AUTO: 60.4 % (ref 16–70)
PLATELET # BLD: 291 TH/MM3 (ref 150–450)
POTASSIUM SERPL-SCNC: 3.5 MEQ/L (ref 3.5–5.1)
RBC # BLD AUTO: 3.68 MIL/MM3 (ref 4–5.3)
SODIUM SERPL-SCNC: 144 MEQ/L (ref 136–145)
WBC # BLD AUTO: 7.9 TH/MM3 (ref 4–11)

## 2017-07-14 RX ADMIN — TEMAZEPAM PRN MG: 15 CAPSULE ORAL at 00:00

## 2017-07-14 RX ADMIN — IPRATROPIUM BROMIDE SCH MG: 0.5 SOLUTION RESPIRATORY (INHALATION) at 08:18

## 2017-07-14 RX ADMIN — PANTOPRAZOLE SCH MG: 40 TABLET, DELAYED RELEASE ORAL at 21:03

## 2017-07-14 RX ADMIN — ALBUTEROL SULFATE SCH MG: 1.25 SOLUTION RESPIRATORY (INHALATION) at 08:18

## 2017-07-14 RX ADMIN — PANTOPRAZOLE SCH MG: 40 TABLET, DELAYED RELEASE ORAL at 00:06

## 2017-07-14 RX ADMIN — DILTIAZEM HYDROCHLORIDE SCH MG: 120 CAPSULE, EXTENDED RELEASE ORAL at 08:31

## 2017-07-14 RX ADMIN — ASPIRIN SCH MG: 81 TABLET ORAL at 08:31

## 2017-07-14 RX ADMIN — HEPARIN SODIUM PRN UNITS: 1000 INJECTION, SOLUTION INTRAVENOUS; SUBCUTANEOUS at 03:42

## 2017-07-14 RX ADMIN — ALBUTEROL SULFATE SCH MG: 1.25 SOLUTION RESPIRATORY (INHALATION) at 13:12

## 2017-07-14 RX ADMIN — MORPHINE SULFATE PRN MG: 8 INJECTION INTRAVENOUS at 01:00

## 2017-07-14 RX ADMIN — GABAPENTIN SCH MG: 300 CAPSULE ORAL at 00:08

## 2017-07-14 RX ADMIN — MORPHINE SULFATE PRN MG: 8 INJECTION INTRAVENOUS at 09:59

## 2017-07-14 RX ADMIN — HEPARIN SODIUM PRN UNITS: 1000 INJECTION, SOLUTION INTRAVENOUS; SUBCUTANEOUS at 08:31

## 2017-07-14 RX ADMIN — HEPARIN SODIUM PRN UNITS: 1000 INJECTION, SOLUTION INTRAVENOUS; SUBCUTANEOUS at 15:32

## 2017-07-14 RX ADMIN — Medication SCH ML: at 08:31

## 2017-07-14 RX ADMIN — FOLIC ACID SCH MG: 1 TABLET ORAL at 21:02

## 2017-07-14 RX ADMIN — MORPHINE SULFATE PRN MG: 8 INJECTION INTRAVENOUS at 21:45

## 2017-07-14 RX ADMIN — IPRATROPIUM BROMIDE SCH MG: 0.5 SOLUTION RESPIRATORY (INHALATION) at 21:06

## 2017-07-14 RX ADMIN — ALBUTEROL SULFATE SCH MG: 1.25 SOLUTION RESPIRATORY (INHALATION) at 16:27

## 2017-07-14 RX ADMIN — ONDANSETRON PRN MG: 2 INJECTION, SOLUTION INTRAMUSCULAR; INTRAVENOUS at 09:57

## 2017-07-14 RX ADMIN — Medication SCH ML: at 21:03

## 2017-07-14 RX ADMIN — STANDARDIZED SENNA CONCENTRATE AND DOCUSATE SODIUM SCH TAB: 8.6; 5 TABLET, FILM COATED ORAL at 08:31

## 2017-07-14 RX ADMIN — TEMAZEPAM PRN MG: 15 CAPSULE ORAL at 21:01

## 2017-07-14 RX ADMIN — FOLIC ACID SCH MG: 1 TABLET ORAL at 00:07

## 2017-07-14 RX ADMIN — MONTELUKAST SODIUM SCH MG: 10 TABLET, COATED ORAL at 21:02

## 2017-07-14 RX ADMIN — IPRATROPIUM BROMIDE SCH MG: 0.5 SOLUTION RESPIRATORY (INHALATION) at 03:52

## 2017-07-14 RX ADMIN — HEPARIN SODIUM SCH MLS/HR: 10000 INJECTION, SOLUTION INTRAVENOUS at 18:06

## 2017-07-14 RX ADMIN — GABAPENTIN SCH MG: 300 CAPSULE ORAL at 21:02

## 2017-07-14 RX ADMIN — STANDARDIZED SENNA CONCENTRATE AND DOCUSATE SODIUM SCH TAB: 8.6; 5 TABLET, FILM COATED ORAL at 21:00

## 2017-07-14 RX ADMIN — IPRATROPIUM BROMIDE SCH MG: 0.5 SOLUTION RESPIRATORY (INHALATION) at 16:26

## 2017-07-14 RX ADMIN — ALBUTEROL SULFATE SCH MG: 1.25 SOLUTION RESPIRATORY (INHALATION) at 21:06

## 2017-07-14 RX ADMIN — GABAPENTIN SCH MG: 300 CAPSULE ORAL at 08:31

## 2017-07-14 NOTE — PD.CONS
HPI


Service


Cardiology Physicians


Consult Requested By


Dr Hassan


Reason for Consult


Chest pain


Primary Care Physician


Terry Casiano MD


History of Present Illness


The patient is a 57  year old well known to our practice with a complex medical 

history. She recently underwent cardiac catheterization 5/5/2017 that revealed 

an occluded LIMA thought to be due to improved flow through the LAD. FFR 0.9 of 

LAD lesion. The patient presented to the hospital due to an onset of constant 

chest pain associated with SOB without palliative or provocative features. She 

tried taking Tums and Mylanta, but the symptoms did not improve. Today, on 

evaluation she complains of headache and continues to have some SOB.   (Pooja Mccurdy)





Review of Systems


Consitutional:  COMPLAINS OF: Weight gain,  DENIES: Fatigue, Fever, Chills, 

Weight loss


Eyes:  DENIES: Amaurosis Fugax, Change in vision


HEENT:  DENIES: Lightheadedness, Change in hearing


Respiratory:  COMPLAINS OF: Shortness of breath,  DENIES: See HPI, Cough, 

Snoring, Wheezing, Sputum production


Cardiovascular:  COMPLAINS OF: Chest pain,  DENIES: See HPI, Palpitations, 

Syncope, Tachycardia


Gastrointestinal:  DENIES: Nausea, Vomiting, Change in bowel habits, Reflux, 

Bloody stools, Melena


Genitourinary:  DENIES: Urinary incontinence, Difficulty voiding


Integumentary:  DENIES: Rash


Neurologic:  DENIES: Tingling or numbness, Memory problems, Poor Balance, 

Stroke symptoms


Musculoskeletal:  DENIES: Joint pain, Muscle pain, Limited range of motion, 

Back pain


Psychiatric:  DENIES: Anxiety, Depression, Sleep disturbances


Hematologic:  DENIES: Bruising tendencies, Bleeding tendencies


Endocrine:  COMPLAINS OF: Weight gain,  DENIES: Weight loss, Thyroid disease (

Pooja Mccurdy)





Past Family Social History


Allergies:  


Coded Allergies:  


     Advair (Verified  Allergy, Severe, Shortness of Breath, 7/13/17)


     HMG-CoA Reductase Inhibitors (Unverified  Allergy, Severe, MUSCLE ACHES, 

JOINT PAIN, SENSITIVE SKIN, 7/13/17)


 FOGGY BRAIN


     Lovastatin (Verified  Allergy, Severe, COUGH, BREATHING DIFFICULTY, HEADACH

, 7/13/17)


     Norvasc (Verified  Allergy, Severe, RASH ON LEGS, EDEMA, 7/13/17)


     Penicillin (Verified  Allergy, Severe, Anaphylaxis, 7/13/17)


     Reglan (Unverified  Allergy, Severe, NEUROLOGICAL EFFECT, WORSENS RESTLEG 

AND CAUSES TREMOR, 7/13/17)


 NEUROLOGICAL EFFECT, WORSENS RESTLEG AND CAUSES TREMOR


     Adhesives (Verified  Allergy, Mild, RASH, 7/13/17)


 MAY USE PAPER TAPE ONLY AND TEGADERM


     Plaquenil (Verified  Adverse Reaction, Severe, MUSCLE ACHING, 7/13/17)


 VISUAL DISTURBANCES


     Lipitor (Verified  Adverse Reaction, Intermediate, NEURALGIA, 7/13/17)


     Tenormin (Verified  Adverse Reaction, Intermediate, SHORTNESS OF BREATH , 

SEVERE LETHARGIC AND EXHUSTED, WEIGHT , 7/14/17)


 PATIENT REPORTS SHE STOPPED TAKING THIS MEDICATION 5 WEEKS


 AGO - SHE WAS SLOWLY WEANED OFF PER DR. FLETCHER


Uncoded Allergies:  


     NICKEL (Allergy, Intermediate, RASH, BURN, 6/15/15)


 CONFIRMED 8/29/14 TM


     STERI STRIPS (Allergy, Intermediate, RASH, BURN, 6/15/15)


 CONFIRMED 8/29/14 TM


Past Medical History


ASHD


HTN


Tachycardia


lupus


GERD


Migraine headaches


restless leg syndrome


TIA


ASTHMA


Past Surgical History


Cath 5/2017, 2015, 2013, 2004


CABG 10/2013


Removal of sternal clips 2015


foot surgery


bladder repair


carpal tunnel release 2006


l5 discetomy 2003


nissen fundoplication 2002


Benign ovary removed 2002


joint irrigations 2001


cholecystectomy 1999


Hysterectomy 1997


CTS right 1997


cyst index finger 1992


tubal ligation 1988


pilonidal cyst 1982


vaginal cyst 1981


appendectomy 1970


total right hip 2016


Reported Medications





Reported Meds & Active Scripts


Active


Reported


Clindamycin (Clindamycin HCl) 300 Mg Cap 600 Mg PO ONCE PRN


Transderm-Scop (Scopolamine) 1 Mg/3 Days Dis 1 Patch T-DERMAL Q3D PRN


Dexamethasone 4 Mg Tab 4 Mg PO AS DIRECTED


Lidoderm (Lidocaine) 5 % Adh..patch 1 Patch T-DERMAL Q12HR PRN


Folic Acid 1 Mg Tablet 1 Mg PO HS


Tylenol (Acetaminophen) 325 Mg Tab 650 Mg PO Q4H PRN


Tylenol (Acetaminophen) 325 Mg Tab 650 Mg PO HS PRN


Aspirin Adult Low Strength (Aspirin) 81 Mg Tabdr 81 Mg PO DAILY


Restoril (Temazepam) 15 Mg Cap 15-30 Mg PO HS


Gabapentin 600 Mg Tab 600 Mg PO BID


Tessalon Perles (Benzonatate) 100 Mg Cap 200 Mg PO TID PRN


Zofran Odt (Ondansetron Odt) 8 Mg Tab 8 Mg SL Q8H PRN


Butorphanol Nasal Spray (Butorphanol Tartrate) 10 Mg/Ml Soln 1 Spray NASAL Q4HR 

PRN


     in one nostril (1 mg). If pain not reliefed in 60-90 minutes, a 1 mg


     repeat dose may be given.  May repeat this in 3-4 hrs if needed.


Epipen 2-Frank Inj (Epinephrine) 0.3 Mg/0.3 Ml Pfpen 0.3 Mg IM ONCE PRN


Xopenex Hfa 15 GM Inh (Levalbuterol 15 GM Inh) 45 Mcg/Act Aer 45 Mcg INH Q6HR


     Shake well before using. (1 puff = 45 mcg)


Xopenex Neb (Levalbuterol HCl) 0.63 Mg/3 Ml Neb 0.63 Mg NEB QID


Ipratropium Neb (Ipratropium Bromide) 0.5 Mg/2.5 Ml Amp 0.5 Mg NEB Q6HR NEB


Amlodipine (Amlodipine Besylate) 2.5 Mg Tab 2.5 Mg PO DAILY PRN


Singulair (Montelukast Sodium) 10 Mg Tab 10 Mg PO HS


Incruse Ellipta Inh (Umeclidinium Bromide Inh) 0.0625 Mg/Act Inh 1 Puff INH 

DAILY


Methotrexate 2.5 Mg Tab 15 Mg PO WEEKLY


Tramadol (Tramadol HCl) 50 Mg Tab 50 Mg PO QID


Cartia Xt (Diltiazem ER 24 HR) 120 Mg Caper 120 Mg PO DAILY


Torsemide 20 Mg Tab 20 Mg PO DAILY


Azithromycin 250 Mg Tab 250 Mg PO HS


     On for 1 week, off for 1 week


Protonix (Pantoprazole Sodium) 40 Mg Tab 40 Mg PO HS


Active Ordered Medications





 Current Medications








 Medications


  (Trade)  Dose


 Ordered  Sig/Janie


 Route  Start Time


 Stop Time Status Last Admin


 


  (Heparin Inj)  5,000 units  UNSCH  PRN


 IV  7/13/17 23:30


     


 


 


 Heparin Sodium


  (Porcine) 2500


 units  2,500 units  UNSCH  PRN


 IV  7/13/17 23:30


    7/14/17 08:31


 


 


  (Heparin-D5W Inj)  250 ml @ 0


 mls/hr  TITRATE


 IV  7/13/17 17:30


    7/13/17 18:02


 


 


  (NS Flush)  2 ml  UNSCH  PRN


 IV FLUSH  7/13/17 17:45


     


 


 


  (NS Flush)  2 ml  BID


 IV FLUSH  7/13/17 21:00


    7/14/17 08:31


 


 


  (Tylenol)  650 mg  Q4H  PRN


 PO  7/13/17 17:45


     


 


 


  (Zofran Inj)  4 mg  Q6H  PRN


 IVP  7/13/17 17:45


    7/14/17 09:57


 


 


  (Compazine Supp)  25 mg  Q12H  PRN


 RECTAL  7/13/17 17:45


     


 


 


  (Oliva-Colace)  1 tab  BID


 PO  7/13/17 21:00


     


 


 


  (Milk Of


 Magnesia Liq)  30 ml  Q12H  PRN


 PO  7/13/17 17:45


     


 


 


  (Senokot)  17.2 mg  Q12H  PRN


 PO  7/13/17 17:45


     


 


 


  (Dulcolax Supp)  10 mg  DAILY  PRN


 RECTAL  7/13/17 17:45


     


 


 


  (Lactulose Liq)  30 ml  DAILY  PRN


 PO  7/13/17 17:45


     


 


 


  (Morphine Inj)  2 mg  Q4H  PRN


 IV PUSH  7/13/17 18:00


    7/14/17 09:59


 


 


  (Ecotrin Ec)  81 mg  DAILY


 PO  7/14/17 09:00


    7/14/17 08:31


 


 


  (Stadol Romero Spr)  1 spray  Q4HR  PRN


 NASAL  7/13/17 18:45


     


 


 


  (Cardizem Cd)  120 mg  DAILY


 PO  7/14/17 09:00


    7/14/17 08:31


 


 


  (Folate)  1 mg  HS


 PO  7/13/17 21:00


    7/14/17 00:07


 


 


  (Neurontin)  600 mg  BID


 PO  7/13/17 21:00


    7/14/17 08:31


 


 


  (Singulair)  10 mg  HS


 PO  7/13/17 21:00


    7/13/17 21:00


 


 


  (Protonix)  40 mg  HS


 PO  7/13/17 21:00


    7/14/17 00:06


 


 


 Patient Own


 Medication  PT OWN


 MED:(Umeclidinium


 Brom...  DAILY


 INH  7/14/17 09:00


   Hold  


 


 


  (Restoril)  15 mg  HS  PRN


 PO  7/13/17 23:15


    7/14/17 00:00


 


 


  (Ultram)  50 mg  DAILY@0600  PRN


 PO  7/13/17 23:15


     


 


 


  (Ultram)  50 mg  DAILY@1400  PRN


 PO  7/14/17 14:00


     


 


 


  (Ultram)  100 mg  HS  PRN


 PO  7/14/17 21:00


     


 


 


  (Tessalon)  200 mg  TID  PRN


 PO  7/14/17 09:15


     


 


 


  (Demadex)  20 mg  DAILY


 PO  7/15/17 09:00


     


 








Family History


positive for heart disease


Social History


non smoker, , lives with  (Pooja Mccurdy)





Physical Exam


Vital Signs





 Vital Signs








  Date Time  Temp Pulse Resp B/P Pulse Ox O2 Delivery O2 Flow Rate FiO2


 


7/14/17 11:15 97.5 79 17 150/86 99   


 


7/14/17 11:00  73      


 


7/14/17 10:00  78      


 


7/14/17 09:00  82      


 


7/14/17 08:20     95   


 


7/14/17 08:00  62      


 


7/14/17 07:30 97.9 75 20 153/82 100   


 


7/14/17 07:00  74      


 


7/14/17 06:00  72      


 


7/14/17 06:00  78      


 


7/14/17 05:00  66      


 


7/14/17 04:51 98.2 66 16 139/59 93   


 


7/14/17 04:00  66      


 


7/14/17 03:00  65      


 


7/14/17 02:00  66      


 


7/14/17 01:00  72      


 


7/14/17 00:00 97.9 87 16 140/80 91   


 


7/14/17 00:00  82      


 


7/13/17 23:00  78      


 


7/13/17 22:00  80      


 


7/13/17 21:00 97.9 88 16 147/69 96   


 


7/13/17 21:00  84      


 


7/13/17 20:39     100 Nasal Cannula 2.00 


 


7/13/17 19:41  82 17 138/68    


 


7/13/17 19:15     98 Nasal Cannula 2.00 


 


7/13/17 19:10  80 17 148/67 98 Nasal Cannula 2 


 


7/13/17 19:07     99 Nasal Cannula 2.00 


 


7/13/17 18:08  84 18 131/73 96 Nasal Cannula 2 


 


7/13/17 16:43  88 18 150/85 97 Room Air  


 


7/13/17 16:35  94 18 170/77 98 Room Air  


 


7/13/17 16:00  87   100 Room Air  


 


7/13/17 16:00     100 Room Air  


 


7/13/17 16:00 98.5 87 18 170/97 100   








Physical Exam


GENERAL: middle aged female doing a breathing treatment


SKIN: Warm and dry.


HEAD: Atraumatic. Normocephalic. 


EYES: Bilateral exopthalmus


ENT: No nasal bleeding or discharge.  


NECK: Trachea midline. 


CARDIOVASCULAR: Regular rate and rhythm.  


RESPIRATORY: doing a breathing treatment. 


GASTROINTESTINAL: Abdomen soft, nondistended. 


MUSCULOSKELETAL: Extremities without clubbing, cyanosis, or edema. 


NEUROLOGICAL: Awake and alert. No obvious cranial nerve deficits.  


PSYCHIATRIC: Appropriate mood and affect; insight and judgment normal.


Laboratory





Laboratory Tests








Test 7/13/17 7/14/17 7/14/17 7/14/17





 16:20 00:56 06:04 07:50


 


White Blood Count 9.6   7.9  


 


Red Blood Count 4.07   3.68  


 


Hemoglobin 11.2   10.0  


 


Hematocrit 33.6   31.3  


 


Mean Corpuscular Volume 82.7   85.1  


 


Mean Corpuscular Hemoglobin 27.6   27.2  


 


Mean Corpuscular Hemoglobin 33.4   32.0  





Concent    


 


Red Cell Distribution Width 19.1   20.0  


 


Platelet Count 353   291  


 


Mean Platelet Volume 8.7   8.7  


 


Neutrophils (%) (Auto) 65.0   60.4  


 


Lymphocytes (%) (Auto) 24.2   27.6  


 


Monocytes (%) (Auto) 9.7   10.3  


 


Eosinophils (%) (Auto) 0.8   1.5  


 


Basophils (%) (Auto) 0.3   0.2  


 


Neutrophils # (Auto) 6.3   4.8  


 


Lymphocytes # (Auto) 2.3   2.2  


 


Monocytes # (Auto) 0.9   0.8  


 


Eosinophils # (Auto) 0.1   0.1  


 


Basophils # (Auto) 0.0   0.0  


 


CBC Comment DIFF FINAL   DIFF FINAL  


 


Differential Comment      


 


Prothrombin Time 11.4    


 


Prothromb Time International 1.0    





Ratio    


 


Activated Partial 27.5  34.9   33.8 





Thromboplast Time    


 


Sodium Level 142   144  


 


Potassium Level 3.2   3.5  


 


Chloride Level 106   108  


 


Carbon Dioxide Level 26.7   27.3  


 


Anion Gap 9   9  


 


Blood Urea Nitrogen 13   12  


 


Creatinine 0.91   0.75  


 


Estimat Glomerular Filtration 64   80  





Rate    


 


Random Glucose 102   92  


 


Calcium Level 8.5   8.3  


 


Magnesium Level 2.0    


 


Troponin I LESS THAN 0.02  LESS THAN 0.02  LESS THAN 0.02  


 


B-Type Natriuretic Peptide 36    


 


Total Bilirubin   0.3  


 


Aspartate Amino Transf   16  





(AST/SGOT)    


 


Alanine Aminotransferase   23  





(ALT/SGPT)    


 


Alkaline Phosphatase   90  


 


Total Protein   6.4  


 


Albumin   3.3  


 


Lipase   89  


 


    





Test 7/14/17   





 14:01   


 


Activated Partial 36.0    





Thromboplast Time    





 (Pooja Mccurdy)


Result Diagram:  


7/14/17 0604                                                                   

             7/14/17 0604





Imaging





Last 72 hours Impressions








CT Angiography 7/13/17 1721 Signed





Impressions: 





 Service Date/Time:  Thursday, July 13, 2017 18:44 - CONCLUSION: There is no 





 evidence for PE for technique.    K. Ethan Tolbert MD 


 


Chest X-Ray 7/13/17 1606 Signed





Impressions: 





 Service Date/Time:  Thursday, July 13, 2017 16:21 - CONCLUSION: No acute 





 disease.       Kolby Quintanilla Jr., MD 





 (Pooja Mccurdy)





Assessment and Plan


Assessment and Plan


ASSESSMENT


Chest pain in patient with recent cardiac cath, troponin X 3 negative and EKG 

with no evidence of new ischemia


ASHD


SOB- CTA negative for PE


HLD 











PLAN:


Will consult Dr Anthony Slade to discontinue heparin and nitroglycerin


Patient was seen and evaluated by DR. Fletcher (Pooja Mccurdy)


Assessment and Plan


The exam, history, and the medical decision-making described in the above note 

were completed with the assistance of the mid-level provider. I reviewed and 

agree with the findings presented.  I attest that I had a face-to-face 

encounter with the patient on the same day, and personally performed and 

documented my assessment and findings in the medical record.Having steady chest 

pain needing strong pain meds; no rise in cardiac Enzymes, recent Cath which 

was negative in May. History of prior GI surgery for GERD, discussed with Dr Cruz. (Melissa Fletcher MD)








Pooja Mccurdy Jul 14, 2017 15:22


Melissa Fletcher MD Jul 14, 2017 19:11

## 2017-07-14 NOTE — MB
cc:

JENIFFER YOST M.D., HUMAYUN A. M.D. KENDRICK, MARK

****

 

 

DATE OF CONSULTATION:  7/14/2017

 

YOB: 1960

 

HISTORY OF PRESENT ILLNESS

The patient is a 57-year-old white female I was asked to see for further

evaluation and management of chest pain.  She has a history of coronary artery

disease status post a single-vessel bypass several years ago.  Over the years

she has had chronic problems with recurrent chest pain and it has been

difficult for her doctors in [place] to distinguish between cardiac etiology

and gastrointestinal etiology.  She tells me yesterday about 9 o'clock while

working at the computer she developed sudden onset chest pressure with

radiation toward the left upper extremity similar to prior cardiac pains.  She

has had dysphagia for solids and hesitation in the mid retrosternal region for

about a year now but nothing has changed.  No odynophagia.  When this pain came

on yesterday morning she took a bunch of Tums and Mylanta and saw no

improvement.  There is increased discomfort with deep inspiration.  There is no

effect with twisting or turning or stretching or bending.  Her chronic early

satiety symptoms are still controlled with the azithromycin once a day for a

week followed by a week off of this product.  She had taken no recent

antibiotics or steroids.

 

PAST MEDICAL/SURGICAL HISTORY

1. Coronary artery disease, status post one-vessel bypass in 2013.

2. Asthma.

3. Gastroesophageal reflux.

4. Hiatal hernia repair.

5. Hypertension.

6. Low back spondylosis.

7. Osteoarthritis.

8. Restless leg syndrome.

9. Prior TIA.

10.Neuritis.

11.Fundoplication surgery.

12.Arthritis.

13.Lupus.

14.Migraine headaches.

15.Anxiety disorder.

16.Precancerous uterine changes before hysterectomy.

17.Cholecystectomy.

18.Appendectomy.

19.Hysterectomy.

20.Bladder repair.

21.Right hip replacement.

22.L4-L5 spinal surgery.

 

MEDICATIONS

Medications on admission:

1. Clindamycin 3 mg once.

2. Scopolamine.

3. Dexamethasone 4 mg as directed.

4. Lidoderm patch.

5. Folic acid 1 mg daily.

6. Tylenol.

7. Restoril.

8. Gabapentin 600 mg b.i.d.

9. Tessalon Perles.

10.Zofran p.r.n.

11.Epipen p.r.n.

12.Xopenex inhaler.

13.Ipratropium nebulizer.

14.Amlodipine 2.5 mg daily p.r.n.

15.Singulair.

16.Incruse Ellipta.

17.Methotrexate 2.5 mg (15 mg per week).

18.Tramadol 50 mg q.i.d.

19.Atenolol 50 mg q.h.s., though she tells me she stopped this recently.

20.Cartia  mg daily.

21.Torsemide 20 mg daily.

22.Azithromycin 250 mg daily, one week on, one week off.

23.Protonix 40 mg at bedtime.

 

ALLERGIES

1. ADVAIR.

2. HMG.

3. COA REDUCTASE INHIBITORS.

4. LOVASTATIN.

5. NORVASC.

6. PENICILLIN.

7. REGLAN.

8. ADHESIVES.

9. PLAQUENIL.

10.LIPITOR.

11.NICKEL.

12.STERI-STRIPS.

 

FAMILY HISTORY

Brain cancer, hypertension, stroke, CHF, migraines.

 

SOCIAL HISTORY

Tobacco use none.  Alcohol use very rare.

 

REVIEW OF SYSTEMS

No recent headaches.  No history of seizures.  No vision difficulties.  No

auditory difficulties.  No change in her chronic mild dysphagia.  No recent

urinary symptoms.  No respiratory difficulties.  No history of liver disorders

or pancreatic problems.  She has gained 40 pounds since starting atenolol.

 

PHYSICAL EXAMINATION

VITAL SIGNS:  Weight is 101 kg.  Temperature 98.2, pulse 72, respiratory rate

16, blood pressure 139/59, saturation 95%.

GENERAL:  She is alert.  She is oriented x3.  She is resting and appears a bit

uncomfortable.

HEENT:  She is anicteric.  Extraocular motions are intact.

LYMPH NODES:  I appreciate no submandibular, cervical, supraclavicular,

axillary or epitrochlear adenopathy.

LUNGS:  Clear to auscultation.  I appreciate no pleural rub.

HEART:  Regular rate and rhythm with no gross murmur or gallop.

LUNGS:  Clear.  No chest wall tenderness.

ABDOMEN:  Good bowel sounds.  No bruit.  The abdomen is soft and nontender.  No

masses or hepatosplenomegaly are noted.

EXTREMITIES:  No pedal edema, Dupuytren's contractures or palmar erythema.

 

LABORATORY STUDIES

Today sodium 144, potassium 3.5, BUN 12, creatinine 0.75.

Liver enzymes are all normal.

Albumin 3.4.

Troponin-I is 0.02 on three occasions.

BNP 36.

White count 7.9, hemoglobin 10.0, platelets 291.

INR 1.0.

 

IMAGING STUDIES

I reviewed the chest x-ray and CT angiogram of the chest with Dr. Quintanilla.  No

abnormality is evident to cause pain.

 

IMPRESSION AND PLAN

Chest pain with radiation to the arm.  This is a steady chronic pressure-type

sensation similar to her prior cardiac symptoms.  I have spoken to Dr. Fletcher

twice already.  She underwent heart catheterization just two months ago.  He

does not feel a repeat catheterization is necessary.  He recommends

panendoscopy to evaluate for esophageal disorders before considering a possible

stress test.  I have discussed endoscopy with her, including potential risks of

medication reaction, bleeding, perforation and a small chance of missing a

lesion.  Will plan on scheduling this examination with esophageal dilation

because of her dysphagia.

 

With her family history of colon polyps she is about 6 months overdue for her

routine screening colonoscopy.  We can coordinate this in the near future when

it is convenient.

 

 

 

 

 

                              _________________________________

                              MD JOSE Esparza/KLYE

D:  7/14/2017/10:38 AM

T:  7/14/2017/11:01 AM

Visit #:  [id number]

Job #:  [id number]

## 2017-07-14 NOTE — HHI.PR
Subjective


Remarks


Follow-up chest pain.  She continues to have retrosternal pain radiating to 

left upper extremity.  States morphine helps.  Discussed with RN, patient for 

EGD in the morning if unremarkable we'll proceed with stress test.  Seen with 







Objective


Vitals





 Vital Signs








  Date Time  Temp Pulse Resp B/P Pulse Ox O2 Delivery O2 Flow Rate FiO2


 


7/14/17 08:20     95   


 


7/14/17 06:00  72      


 


7/14/17 06:00  78      


 


7/14/17 05:00  66      


 


7/14/17 04:51 98.2 66 16 139/59 93   


 


7/14/17 04:00  66      


 


7/14/17 03:00  65      


 


7/14/17 02:00  66      


 


7/14/17 01:00  72      


 


7/14/17 00:00 97.9 87 16 140/80 91   


 


7/14/17 00:00  82      


 


7/13/17 23:00  78      


 


7/13/17 22:00  80      


 


7/13/17 21:00 97.9 88 16 147/69 96   


 


7/13/17 21:00  84      


 


7/13/17 20:39     100 Nasal Cannula 2.00 


 


7/13/17 19:41  82 17 138/68    


 


7/13/17 19:15     98 Nasal Cannula 2.00 


 


7/13/17 19:10  80 17 148/67 98 Nasal Cannula 2 


 


7/13/17 19:07     99 Nasal Cannula 2.00 


 


7/13/17 18:08  84 18 131/73 96 Nasal Cannula 2 


 


7/13/17 16:43  88 18 150/85 97 Room Air  


 


7/13/17 16:35  94 18 170/77 98 Room Air  


 


7/13/17 16:00  87   100 Room Air  


 


7/13/17 16:00     100 Room Air  


 


7/13/17 16:00 98.5 87 18 170/97 100   








 I/O








 7/13/17 7/13/17 7/13/17 7/14/17 7/14/17 7/14/17





 07:00 15:00 23:00 07:00 15:00 23:00


 


Intake Total    240 ml  


 


Balance    240 ml  


 


      


 


Intake Oral    240 ml  


 


# Voids   1 1  








Result Diagram:  


7/14/17 0604                                                                   

             7/14/17 0604





Imaging





Last Impressions








CT Angiography 7/13/17 1721 Signed





Impressions: 





 Service Date/Time:  Thursday, July 13, 2017 18:44 - CONCLUSION: There is no 





 evidence for PE for deon FERNANDO. Ethan Tolbert MD 


 


Chest X-Ray 7/13/17 1606 Signed





Impressions: 





 Service Date/Time:  Thursday, July 13, 2017 16:21 - CONCLUSION: No acute 





 disease.       Kolby Quintanilla Jr., MD 








Objective Remarks


GENERAL: This is a female patient, in no apparent distress.


SKIN: No rashes, ecchymoses or lesions. Cool and dry.


HEAD: Atraumatic. Normocephalic. 


EYES: No scleral icterus. No injection or drainage. 


ENT: Nose without bleeding, purulent drainage. 


NECK: Trachea midline. No JVD. 


CARDIOVASCULAR: Regular rate and rhythm without murmurs, gallops, or rubs. 


RESPIRATORY: Clear to auscultation. Breath sounds equal bilaterally. No wheezes

, rales, or rhonchi.  


GASTROINTESTINAL: Abdomen soft, non-tender, nondistended. No guarding.


MUSCULOSKELETAL: Extremities without clubbing, cyanosis, or edema. No calf 

tenderness. 


NEUROLOGICAL: Awake and alert. Motor and sensory grossly within normal limits. 

Normal speech.





A/P


Problem List:  


(1) Unstable angina


ICD Code:  I20.0


Status:  Acute


Assessment and Plan


58 y/o female presented to [facility] complaining of chest pain. CTA was done 

and was negative for PE. The patient is transferred to the main campus for 

further management and evaluation. 





Unstable Angina history of coronary artery disease status post CABG and cardiac 

catheterization 5 2017.  She ruled out for MI.  LDL 67.  A1c 5.3


- Nitroglycerin and Heparin drips will clarify with cardiology if drips can be 

discontinued since patient ruled out for MI 


- Continuous cardiac telemetry to monitor for arrhythmia 


- Monitor intake and output q shift 


- Monitor vital signs q4h 


- Morphine 2 mg IV q4h PRN chest pain 


- Check lipase. For EGD in the morning if negative proceed with stress test





Chronic medical conditions of Asthma, IBS, Gastroesophageal reflux disease, 

Hypertension, Lower back spondylosis, Osteoarthritis , Restless leg syndrome , 

TIA, Neuritis, Gastroparesis/Nissen fundoplication, Arthritis, Anxiety, 

Migraine headache and Pre-cancerous uterine changes. Denies CHF, hypertension, 

diabetes mellitus, liver problems, kidney problems, DVT, PE, seizures, thyroid 

problems, or cancers.  Continue outpatient medications as appropriate


.


DVT prophylaxis 


- Heparin gtt


Discharge Planning


Not ready for discharge








Paul Hassan MD Jul 14, 2017 09:04

## 2017-07-15 VITALS
HEART RATE: 84 BPM | SYSTOLIC BLOOD PRESSURE: 151 MMHG | DIASTOLIC BLOOD PRESSURE: 88 MMHG | RESPIRATION RATE: 16 BRPM | TEMPERATURE: 98.6 F | OXYGEN SATURATION: 96 %

## 2017-07-15 VITALS
TEMPERATURE: 98.1 F | OXYGEN SATURATION: 99 % | DIASTOLIC BLOOD PRESSURE: 90 MMHG | SYSTOLIC BLOOD PRESSURE: 139 MMHG | HEART RATE: 80 BPM | RESPIRATION RATE: 20 BRPM

## 2017-07-15 VITALS — HEART RATE: 71 BPM

## 2017-07-15 VITALS — HEART RATE: 86 BPM

## 2017-07-15 VITALS
TEMPERATURE: 98.2 F | HEART RATE: 68 BPM | DIASTOLIC BLOOD PRESSURE: 81 MMHG | OXYGEN SATURATION: 96 % | RESPIRATION RATE: 20 BRPM | SYSTOLIC BLOOD PRESSURE: 141 MMHG

## 2017-07-15 VITALS
SYSTOLIC BLOOD PRESSURE: 134 MMHG | HEART RATE: 70 BPM | OXYGEN SATURATION: 100 % | DIASTOLIC BLOOD PRESSURE: 87 MMHG | TEMPERATURE: 97.8 F | RESPIRATION RATE: 20 BRPM

## 2017-07-15 VITALS — HEART RATE: 76 BPM

## 2017-07-15 VITALS — HEART RATE: 62 BPM

## 2017-07-15 VITALS — HEART RATE: 77 BPM

## 2017-07-15 VITALS — RESPIRATION RATE: 20 BRPM | HEART RATE: 74 BPM | SYSTOLIC BLOOD PRESSURE: 122 MMHG | DIASTOLIC BLOOD PRESSURE: 68 MMHG

## 2017-07-15 VITALS — HEART RATE: 84 BPM

## 2017-07-15 VITALS — OXYGEN SATURATION: 96 %

## 2017-07-15 VITALS — HEART RATE: 78 BPM

## 2017-07-15 VITALS — HEART RATE: 72 BPM

## 2017-07-15 VITALS
HEART RATE: 67 BPM | OXYGEN SATURATION: 96 % | DIASTOLIC BLOOD PRESSURE: 88 MMHG | SYSTOLIC BLOOD PRESSURE: 146 MMHG | TEMPERATURE: 98.6 F | RESPIRATION RATE: 16 BRPM

## 2017-07-15 VITALS — HEART RATE: 68 BPM

## 2017-07-15 VITALS
RESPIRATION RATE: 16 BRPM | TEMPERATURE: 98.3 F | HEART RATE: 86 BPM | SYSTOLIC BLOOD PRESSURE: 151 MMHG | DIASTOLIC BLOOD PRESSURE: 73 MMHG | OXYGEN SATURATION: 100 %

## 2017-07-15 VITALS — HEART RATE: 74 BPM

## 2017-07-15 VITALS — HEART RATE: 80 BPM

## 2017-07-15 VITALS — HEART RATE: 73 BPM

## 2017-07-15 VITALS — HEART RATE: 66 BPM

## 2017-07-15 PROCEDURE — 0D728ZZ DILATION OF MIDDLE ESOPHAGUS, VIA NATURAL OR ARTIFICIAL OPENING ENDOSCOPIC: ICD-10-PCS | Performed by: INTERNAL MEDICINE

## 2017-07-15 RX ADMIN — Medication SCH ML: at 22:25

## 2017-07-15 RX ADMIN — IPRATROPIUM BROMIDE SCH MG: 0.5 SOLUTION RESPIRATORY (INHALATION) at 07:35

## 2017-07-15 RX ADMIN — PANTOPRAZOLE SCH MG: 40 TABLET, DELAYED RELEASE ORAL at 22:24

## 2017-07-15 RX ADMIN — ALBUTEROL SULFATE SCH MG: 1.25 SOLUTION RESPIRATORY (INHALATION) at 11:41

## 2017-07-15 RX ADMIN — DILTIAZEM HYDROCHLORIDE SCH MG: 120 CAPSULE, EXTENDED RELEASE ORAL at 07:50

## 2017-07-15 RX ADMIN — ALBUTEROL SULFATE SCH MG: 1.25 SOLUTION RESPIRATORY (INHALATION) at 07:35

## 2017-07-15 RX ADMIN — FOLIC ACID SCH MG: 1 TABLET ORAL at 22:23

## 2017-07-15 RX ADMIN — Medication SCH ML: at 07:52

## 2017-07-15 RX ADMIN — ALBUTEROL SULFATE SCH MG: 1.25 SOLUTION RESPIRATORY (INHALATION) at 15:33

## 2017-07-15 RX ADMIN — Medication PRN ML: at 14:14

## 2017-07-15 RX ADMIN — STANDARDIZED SENNA CONCENTRATE AND DOCUSATE SODIUM SCH TAB: 8.6; 5 TABLET, FILM COATED ORAL at 22:24

## 2017-07-15 RX ADMIN — MONTELUKAST SODIUM SCH MG: 10 TABLET, COATED ORAL at 22:24

## 2017-07-15 RX ADMIN — ONDANSETRON PRN MG: 2 INJECTION, SOLUTION INTRAMUSCULAR; INTRAVENOUS at 09:07

## 2017-07-15 RX ADMIN — ONDANSETRON PRN MG: 2 INJECTION, SOLUTION INTRAMUSCULAR; INTRAVENOUS at 14:14

## 2017-07-15 RX ADMIN — MORPHINE SULFATE PRN MG: 8 INJECTION INTRAVENOUS at 03:25

## 2017-07-15 RX ADMIN — TORSEMIDE SCH MG: 20 TABLET ORAL at 07:51

## 2017-07-15 RX ADMIN — GABAPENTIN SCH MG: 300 CAPSULE ORAL at 22:23

## 2017-07-15 RX ADMIN — IPRATROPIUM BROMIDE SCH MG: 0.5 SOLUTION RESPIRATORY (INHALATION) at 02:50

## 2017-07-15 RX ADMIN — IPRATROPIUM BROMIDE SCH MG: 0.5 SOLUTION RESPIRATORY (INHALATION) at 15:33

## 2017-07-15 RX ADMIN — STANDARDIZED SENNA CONCENTRATE AND DOCUSATE SODIUM SCH TAB: 8.6; 5 TABLET, FILM COATED ORAL at 09:00

## 2017-07-15 RX ADMIN — Medication PRN ML: at 09:08

## 2017-07-15 RX ADMIN — GABAPENTIN SCH MG: 300 CAPSULE ORAL at 07:51

## 2017-07-15 RX ADMIN — MORPHINE SULFATE PRN MG: 8 INJECTION INTRAVENOUS at 09:07

## 2017-07-15 RX ADMIN — ASPIRIN SCH MG: 81 TABLET ORAL at 07:51

## 2017-07-15 RX ADMIN — MORPHINE SULFATE PRN MG: 8 INJECTION INTRAVENOUS at 14:14

## 2017-07-15 RX ADMIN — IPRATROPIUM BROMIDE SCH MG: 0.5 SOLUTION RESPIRATORY (INHALATION) at 21:56

## 2017-07-15 RX ADMIN — POTASSIUM CHLORIDE SCH MEQ: 20 TABLET, EXTENDED RELEASE ORAL at 09:03

## 2017-07-15 NOTE — [facility].DCPOC
Discharge Care Plan


Diagnosis:  


(1) Chest pain


Your Health Problems Are:     Difficulty with ADL


         Exercise Tolerance


Goals to Promote Your Health


* To prevent worsening of your condition and complications


* To maintain your health at the optimal level


Directions to Meet Your Goals


*** Take your medications as prescribed


*** Follow your dietary instruction


*** Follow activity as directed








*** Keep your appointments as scheduled


*** Take your immunizations and boosters as scheduled


*** If your symptoms worsen call your PCP, if no PCP go to Urgent Care Center 

or Emergency Room***


*** Smoking is Dangerous to Your Health. Avoid second hand smoke***


***Call the 24-hour hour crisis hotline for domestic abuse at 1-101.289.2628***








Paul Hassan MD Jul 15, 2017 13:30

## 2017-07-15 NOTE — EKG
Date Performed: 07/14/2017       Time Performed: 06:07:22

 

PTAGE:      57 years

 

EKG:      Sinus rhythm 

 

 Possible anterior infarct - age undetermined Lateral T wave changes are nonspecific Abnormal ECG

 

PREVIOUS TRACING       : 07/13/2017 23.42

 

DOCTOR:   Andrew Feldman  Interpretating Date/Time  07/15/2017 10:48:42

## 2017-07-15 NOTE — HHI.PR
Subjective


Remarks


Follow-up chest pain.  Still with constant retrosternal pain scale of 5 out of 

10 with occasional radiation to her left upper extremity.  Awaiting EGD this 

morning.  Discussed with RN





Objective


Vitals





 Vital Signs








  Date Time  Temp Pulse Resp B/P Pulse Ox O2 Delivery O2 Flow Rate FiO2


 


7/15/17 07:36     96   21


 


7/15/17 06:00  68      


 


7/15/17 05:00  62      


 


7/15/17 04:00  64      


 


7/15/17 04:00 98.6 67 16 146/88 96   


 


7/15/17 03:00  68      


 


7/15/17 02:00  73      


 


7/15/17 01:00  68      


 


7/15/17 00:35 98.6 84 16 151/88 96   


 


7/15/17 00:00  68      


 


7/14/17 23:00  70      


 


7/14/17 21:06     99   21


 


7/14/17 21:00  80      


 


7/14/17 20:57 98.5 80 16 146/86 97   


 


7/14/17 20:00  70      


 


7/14/17 19:00  80      


 


7/14/17 18:00  66      


 


7/14/17 17:00  79      


 


7/14/17 16:00  68      


 


7/14/17 15:00 97.7 75 19 146/78 98   


 


7/14/17 15:00  70      


 


7/14/17 14:00  68      


 


7/14/17 13:00  68      


 


7/14/17 12:00  76      


 


7/14/17 11:15 97.5 79 17 150/86 99   


 


7/14/17 11:00  73      


 


7/14/17 10:00  78      


 


7/14/17 09:00  82      


 


7/14/17 08:20     95   








 I/O








 7/14/17 7/14/17 7/14/17 7/15/17 7/15/17 7/15/17





 07:00 15:00 23:00 07:00 15:00 23:00


 


Intake Total 240 ml  1300 ml 240 ml  


 


Output Total    0 ml  


 


Balance 240 ml  1300 ml 240 ml  


 


      


 


Intake Oral 240 ml  1300 ml 240 ml  


 


Output Stool Total    0 ml  


 


# Voids 1  5 3  








Result Diagram:  


7/14/17 0604                                                                   

             7/14/17 0604





Imaging





Last Impressions








CT Angiography 7/13/17 1721 Signed





Impressions: 





 Service Date/Time:  Thursday, July 13, 2017 18:44 - CONCLUSION: There is no 





 evidence for PE for deon TONY Ethan Tolbert MD 


 


Chest X-Ray 7/13/17 1606 Signed





Impressions: 





 Service Date/Time:  Thursday, July 13, 2017 16:21 - CONCLUSION: No acute 





 disease.       Kolby Quintanilla Jr., MD 








Objective Remarks


GENERAL: This is a female patient, in no apparent distress.


SKIN: No rashes, ecchymoses or lesions. Cool and dry.


HEAD: Atraumatic. Normocephalic. 


EYES: No scleral icterus. No injection or drainage. 


ENT: Nose without bleeding, purulent drainage. 


NECK: Trachea midline. No JVD. 


CARDIOVASCULAR: Regular rate and rhythm without murmurs, gallops, or rubs. 


RESPIRATORY: Clear to auscultation. Breath sounds equal bilaterally. No wheezes

, rales, or rhonchi.  


GASTROINTESTINAL: Abdomen soft, non-tender, nondistended. No guarding.


MUSCULOSKELETAL: Extremities without clubbing, cyanosis, or edema. No calf 

tenderness. 


NEUROLOGICAL: Awake and alert. Motor and sensory grossly within normal limits. 

Normal speech.





A/P


Problem List:  


(1) Unstable angina


ICD Code:  I20.0


Status:  Acute


Assessment and Plan


58 y/o female presented to [facility] complaining of chest pain. CTA was done 

and was negative for PE. The patient is transferred to the main campus for 

further management and evaluation. 





Unstable Angina history of coronary artery disease status post CABG and cardiac 

catheterization 5 2017.  She ruled out for MI.  LDL 67.  A1c 5.3.  Lipase 

within normal limits


- Status post Nitroglycerin and Heparin drips


- Continuous cardiac telemetry to monitor for arrhythmia 


- Monitor intake and output q shift 


- Monitor vital signs q4h 


- Morphine 2 mg IV q4h PRN chest pain 


- For EGD if negative proceed with stress test





Chronic medical conditions of Asthma, IBS, Gastroesophageal reflux disease, 

Hypertension, Lower back spondylosis, Osteoarthritis , Restless leg syndrome , 

TIA, Neuritis, Gastroparesis/Nissen fundoplication, Arthritis, Anxiety, 

Migraine headache and Pre-cancerous uterine changes. Denies CHF, hypertension, 

diabetes mellitus, liver problems, kidney problems, DVT, PE, seizures, thyroid 

problems, or cancers.  Continue outpatient medications as appropriate


.


DVT prophylaxis 


-SCD.  Patient ambulatory


Discharge Planning


Discharge when cleared by GI and cardiology








Paul Hassan MD Jul 15, 2017 08:06

## 2017-07-15 NOTE — EKG
Date Performed: 07/13/2017       Time Performed: 23:42:02

 

PTAGE:      57 years

 

EKG:      Sinus rhythm 

 

 Poor R wave progression - probable normal variant Extensive ST-T changes are nonspecific Borderline 
ECG

 

PREVIOUS TRACING       : 07/13/2017 15.53

 

DOCTOR:   Andrew Feldman  Interpretating Date/Time  07/15/2017 10:53:26

## 2017-07-15 NOTE — EKG
Date Performed: 07/13/2017       Time Performed: 15:53:50

 

PTAGE:      57 years

 

EKG:      Sinus rhythm 

 

 NONSPECIFIC ST & T-WAVE ABNORMALITY BORDERLINE ECG

 

PREVIOUS TRACING       : 05/06/2017 05.43

 

DOCTOR:   Andrew Feldman  Interpretating Date/Time  07/15/2017 10:58:33

## 2017-07-16 VITALS
HEART RATE: 82 BPM | DIASTOLIC BLOOD PRESSURE: 75 MMHG | TEMPERATURE: 98.2 F | OXYGEN SATURATION: 98 % | RESPIRATION RATE: 16 BRPM | SYSTOLIC BLOOD PRESSURE: 150 MMHG

## 2017-07-16 VITALS — HEART RATE: 82 BPM

## 2017-07-16 VITALS
RESPIRATION RATE: 16 BRPM | SYSTOLIC BLOOD PRESSURE: 140 MMHG | DIASTOLIC BLOOD PRESSURE: 95 MMHG | OXYGEN SATURATION: 98 % | TEMPERATURE: 98.3 F | HEART RATE: 70 BPM

## 2017-07-16 VITALS — HEART RATE: 84 BPM

## 2017-07-16 VITALS
TEMPERATURE: 97.5 F | RESPIRATION RATE: 20 BRPM | OXYGEN SATURATION: 99 % | SYSTOLIC BLOOD PRESSURE: 122 MMHG | DIASTOLIC BLOOD PRESSURE: 97 MMHG | HEART RATE: 69 BPM

## 2017-07-16 VITALS
RESPIRATION RATE: 20 BRPM | TEMPERATURE: 98.8 F | HEART RATE: 86 BPM | DIASTOLIC BLOOD PRESSURE: 93 MMHG | SYSTOLIC BLOOD PRESSURE: 113 MMHG | OXYGEN SATURATION: 96 %

## 2017-07-16 VITALS — HEART RATE: 72 BPM

## 2017-07-16 VITALS — HEART RATE: 68 BPM

## 2017-07-16 VITALS — HEART RATE: 74 BPM

## 2017-07-16 VITALS — OXYGEN SATURATION: 99 %

## 2017-07-16 RX ADMIN — STANDARDIZED SENNA CONCENTRATE AND DOCUSATE SODIUM SCH TAB: 8.6; 5 TABLET, FILM COATED ORAL at 08:46

## 2017-07-16 RX ADMIN — DILTIAZEM HYDROCHLORIDE SCH MG: 120 CAPSULE, EXTENDED RELEASE ORAL at 08:45

## 2017-07-16 RX ADMIN — ALBUTEROL SULFATE SCH MG: 1.25 SOLUTION RESPIRATORY (INHALATION) at 08:00

## 2017-07-16 RX ADMIN — IPRATROPIUM BROMIDE SCH MG: 0.5 SOLUTION RESPIRATORY (INHALATION) at 04:00

## 2017-07-16 RX ADMIN — MORPHINE SULFATE PRN MG: 8 INJECTION INTRAVENOUS at 01:10

## 2017-07-16 RX ADMIN — ASPIRIN SCH MG: 81 TABLET ORAL at 08:45

## 2017-07-16 RX ADMIN — POTASSIUM CHLORIDE SCH MEQ: 20 TABLET, EXTENDED RELEASE ORAL at 08:45

## 2017-07-16 RX ADMIN — GABAPENTIN SCH MG: 300 CAPSULE ORAL at 08:45

## 2017-07-16 RX ADMIN — TORSEMIDE SCH MG: 20 TABLET ORAL at 08:45

## 2017-07-16 RX ADMIN — Medication SCH ML: at 08:46

## 2017-07-16 RX ADMIN — ONDANSETRON PRN MG: 2 INJECTION, SOLUTION INTRAMUSCULAR; INTRAVENOUS at 01:09

## 2017-07-16 RX ADMIN — IPRATROPIUM BROMIDE SCH MG: 0.5 SOLUTION RESPIRATORY (INHALATION) at 10:01

## 2017-07-16 NOTE — HHI.DS
__________________________________________________





Discharge Summary


Admission Date


Jul 13, 2017 at 17:38


Discharge Date:  Jul 16, 2017


Admitting Diagnosis


UNSTABLE ANGINA





(1) Unstable angina


ICD Code:  I20.0


Diagnosis:  Principal





Procedures


EGD


Brief History - From Admission


Written by Katherin Mahoney, acting as scribe for Dr. Mercado on 7/13/17 at 22:53. 





Centrally located chest pain described as pressure - took tums and gaviscon 

without relief, radiated to left side of neck and down left arm.  Accompanied 

by nausea and diaphoresis.  She was short of breath with exertion.  The pain 

never went away.  She is currently having pain but it is currently down from a 

9 to a 5.  She states it was helped with nitroglycerin; she is currently on a 

nitro drip.  Atenolol was discontinued and fatigue, edema, and shortness of 

breath associated with the medication improved.  





She reports a history of SVT and states her heart will skip a beat at times.  

When she coughs, it stops. 


Denies fever, n/v/d, dysuria, hematuria, black or red stool, abdominal pain, or 

cough.





Cardiologist is Dr. Fletcher


CBC/BMP:  


7/14/17 0604                                                                   

             7/14/17 0604





Significant Findings





Laboratory Tests








Test 7/13/17 7/14/17 7/14/17 7/14/17





 16:20 00:56 06:04 07:50


 


Hemoglobin 11.2 GM/DL  10.0 GM/DL 





 (11.6-15.3)  (11.6-15.3) 


 


Hematocrit 33.6 %  31.3 % 





 (35.0-46.0)  (35.0-46.0) 


 


Red Cell Distribution Width 19.1 %  20.0 % 





 (11.6-17.2)  (11.6-17.2) 


 


Monocytes (%) (Auto) 9.7 % (0.0-8.0)  10.3 % 





   (0.0-8.0) 


 


Potassium Level 3.2 MEQ/L   





 (3.5-5.1)   


 


Estimat Glomerular Filtration 64 ML/MIN (>89)  80 ML/MIN (>89) 





Rate    


 


Troponin I LESS THAN 0.02 LESS THAN 0.02 LESS THAN 0.02 





 NG/ML NG/ML NG/ML 





 (0.02-0.05) (0.02-0.05) (0.02-0.05) 


 


Activated Partial  34.9 SEC  33.8 SEC





Thromboplast Time  (24.3-30.1)  (24.3-30.1)


 


Red Blood Count   3.68 MIL/MM3 





   (4.00-5.30) 


 


Chloride Level   108 MEQ/L 





   () 


 


Calcium Level   8.3 MG/DL 





   (8.5-10.1) 


 


Albumin   3.3 GM/DL 





   (3.4-5.0) 


 


    





Test 7/14/17 7/14/17  





 14:01 22:03  


 


Activated Partial 36.0 SEC 31.3 SEC  





Thromboplast Time (24.3-30.1) (24.3-30.1)  








Imaging





Last Impressions








CT Angiography 7/13/17 1721 Signed





Impressions: 





 Service Date/Time:  Thursday, July 13, 2017 18:44 - CONCLUSION: There is no 





 evidence for PE for technique.    K. Ethan Tolbert MD 


 


Chest X-Ray 7/13/17 1606 Signed





Impressions: 





 Service Date/Time:  Thursday, July 13, 2017 16:21 - CONCLUSION: No acute 





 disease.       Kolby Quintanilla Jr., MD 








PE at Discharge


Well-developed, obese in no distress


Regular rate and rhythm no murmur


Clear to auscultation no wheezes


Abdomen soft nontender obese


Extremities no cyanosis with improved distal leg edema


Hospital Course


56 y/o female presented to [facility] complaining of chest pain. CTA was done 

and was negative for PE. The patient was transferred to the [facility] for 

further management and evaluation.  Because of history of coronary artery 

disease status post CABG, she was ruled out for MI.  She had negative cardiac 

catheterization in May 2017.  Underwent EGD which showed esophageal motor 

disorder and had dilation with improvement of her symptoms.  Advise to avoid 

cold fluid/foods and drink warm liquids specially before and with pills and 

meals.  She also had uncontrolled hypertension and Cardizem was increased to 

180 mg daily.  She was discharged in stable condition


Pt Condition on Discharge:  Stable


Discharge Disposition:  Discharge Home


Discharge Time:  > 30 minutes


Discharge Instructions


DIET: Follow Instructions for:  Heart Healthy Diet


Additional Diet Instructions:  


AVOID COLD FLUID / FOODS & DRINK WARM LIQUIDS ESPECIALLY BEFORE AND WITH 


PILLS & MEALS


Activities you can perform:  Regular-No Restrictions


Activities to Avoid:  Driving


Follow up Referrals:  


Cardiology - 1 Week


Gastroenterology - 1 Week


PCP Follow-up - 1 Week





New Medications:  


Diltiazem CD 24 HR (Cardizem CD 24 HR) 180 Mg Caper


180 MG PO DAILY Blood Pressure Management #30 CAP


Potassium Chloride Microencaps (Potassium Chloride Microencaps) 20 Meq Tab


20 MEQ PO DAILY Electrolyte Replacement #30 TAB


 


Continued Medications:  


Acetaminophen (Tylenol) 325 Mg Tab


650 MG PO HS PRN PAIN SCALE 1 TO 10 Ref 0 TAB


Acetaminophen (Tylenol) 325 Mg Tab


650 MG PO Q4H PRN PAIN 1-10 AND/OR FEVER >101F Ref 0 TAB


Aspirin DR (Aspirin Adult Low Strength) 81 Mg Tabdr


81 MG PO DAILY TAB


Azithromycin (Azithromycin) 250 Mg Tab


250 MG PO HS On for 1 week, off for 1 week Infection Ref 0 TAB


Benzonatate (Tessalon Perles) 100 Mg Cap


200 MG PO TID PRN COUGH Ref 0 CAP


Butorphanol Nasal Spray (Butorphanol Nasal Spray) 10 Mg/Ml Soln


1 SPRAY NASAL Q4HR in one nostril (1 mg). If pain not reliefed in 60-90 minutes

, a 1 mg repeat dose may be given.  May repeat this in 3-4 hrs if needed. PRN 

Pain Management #2.5 ML


Dexamethasone (Dexamethasone) 4 Mg Tab


4 MG PO AS DIRECTED Ref 0 TAB


Epinephrine Inj (Epipen 2-Frank Inj) 0.3 Mg/0.3 Ml Pfpen


0.3 MG IM ONCE PRN ALLERGIC REACTION #1 Ref 0 PACK


Folic Acid (Folic Acid) 1 Mg Tablet


1 MG PO HS


Gabapentin (Gabapentin) 600 Mg Tab


600 MG PO BID #60 Ref 0 TAB


Ipratropium Neb (Ipratropium Neb) 0.5 Mg/2.5 Ml Amp


0.5 MG NEB Q6HR NEB Breathing Treatment Ref 0 NEBULE


Levalbuterol 15 GM Inh (Xopenex Hfa 15 GM Inh) 45 Mcg/Act Aer


45 MCG INH Q6HR Shake well before using. (1 puff = 45 mcg) #1 Ref 0 INHALER


Levalbuterol Neb (Xopenex Neb) 0.63 Mg/3 Ml Neb


0.63 MG NEB QID Breathing Treatment #120 Ref 0 NEBULE


Lidocaine (Lidoderm) 5 % Adh..patch


1 PATCH T-DERMAL Q12HR PRN PAIN SCALE 1 TO 10


Methotrexate (Methotrexate) 2.5 Mg Tab


15 MG PO WEEKLY Ref 0 TAB


Montelukast (Singulair) 10 Mg Tab


10 MG PO HS #30 Ref 0 TAB


Ondansetron Odt (Zofran Odt) 8 Mg Tab


8 MG SL Q8H PRN NAUSEA OR VOMITING Ref 0 TAB


Pantoprazole (Protonix) 40 Mg Tab


40 MG PO HS Reflux #30 Ref 0 TAB


Scopolamine (Transderm-Scop) 1 Mg/3 Days Dis


1 PATCH T-DERMAL Q3D PRN motion sickness


Temazepam (Restoril) 15 Mg Cap


15-30 MG PO HS INSOMNIA #30 Ref 0 CAP


Torsemide (Torsemide) 20 Mg Tab


20 MG PO DAILY #30 Ref 0 TAB


Tramadol (Tramadol) 50 Mg Tab


50 MG PO QID PAIN Ref 0 TAB


Umeclidinium Bromide Inh (Incruse Ellipta Inh) 0.0625 Mg/Act Inh


1 PUFF INH DAILY Treat COPD #1 Ref 0 INHALER





Additional Information


I spent 35 minutes face-to-face with the patient or on the kenney discussing the 

patient's disposition, prognosis, and plan of care with patient's caregivers.  

Over half the time spent was devoted to counseling the patient regarding 

placement in coordinating care with caregivers and case management.








Paul Hassan MD Jul 16, 2017 10:02

## 2017-07-16 NOTE — HHI.PR
Subjective


Remarks


Follow-up chest pain.  She feels much better has not required pain medicines 

today.  Tolerated EGD which showed esophageal dysmotility status post 

dilatation.  Discussed with RN, patient can be discharged.  Cardizem also has 

been increased to 180 mg by mouth daily for better BP control.





Objective


Vitals





 Vital Signs








  Date Time  Temp Pulse Resp B/P Pulse Ox O2 Delivery O2 Flow Rate FiO2


 


7/16/17 09:00  84      


 


7/16/17 08:00  68      


 


7/16/17 07:00 97.5 75 20 122/97 99   


 


7/16/17 07:00  69      


 


7/16/17 04:56 98.2 82 16 150/75 98   


 


7/16/17 04:00  82      


 


7/16/17 02:00  72      


 


7/16/17 01:00  74      


 


7/16/17 00:00  70      


 


7/16/17 00:00 98.3 76 16 140/95 98   


 


7/15/17 23:00  77      


 


7/15/17 22:00  84      


 


7/15/17 21:56     96   21


 


7/15/17 21:00  76      


 


7/15/17 20:18 98.3 86 16 151/73 100   


 


7/15/17 20:00  86      


 


7/15/17 19:00  78      


 


7/15/17 18:00  71      


 


7/15/17 17:00  73      


 


7/15/17 16:00  78      


 


7/15/17 15:00 98.1 76 20 139/90 99   


 


7/15/17 15:00  80      


 


7/15/17 14:00  74      


 


7/15/17 13:00  80      


 


7/15/17 12:59  74 20 122/68    


 


7/15/17 12:38  77 16 122/68 97 Nasal Cannula 2 


 


7/15/17 12:27  80 16 117/60 97 Nasal Cannula 2 


 


7/15/17 12:18 98.3 76 16 111/63 98 Nasal Cannula 2 


 


7/15/17 11:00  64      


 


7/15/17 11:00 98.2 68 20 141/81 96   


 


7/15/17 10:00  66      








 I/O








 7/15/17 7/15/17 7/15/17 7/16/17 7/16/17 7/16/17





 07:00 15:00 23:00 07:00 15:00 23:00


 


Intake Total 240 ml 0 ml 1560 ml  520 ml 


 


Output Total 0 ml 0 ml   0 ml 


 


Balance 240 ml 0 ml 1560 ml  520 ml 


 


      


 


Intake Oral 240 ml 0 ml 1560 ml  520 ml 


 


IV Total  0 ml    


 


Other  0 ml    


 


Output Urine Total  0 ml    


 


Stool Total 0 ml    0 ml 


 


Estimated Blood Loss  0 ml    


 


Other  0 ml    


 


# Voids 3  7  3 








Result Diagram:  


7/14/17 0604 7/14/17 0604





Objective Remarks


Well-developed, obese in no distress


Regular rate and rhythm no murmur


Clear to auscultation no wheezes


Abdomen soft nontender obese


Extremities no cyanosis with improved distal leg edema


Procedures


EGD





A/P


Problem List:  


(1) Unstable angina


ICD Code:  I20.0


Status:  Acute


Assessment and Plan


58 y/o female presented to [facility] complaining of chest pain. CTA was done 

and was negative for PE. The patient is transferred to the [facility] for 

further management and evaluation. 





Chest pain history of coronary artery disease status post CABG and cardiac 

catheterization 5 2017.  She ruled out for MI.  LDL 67.  A1c 5.3.  Lipase 

within normal limits


- Improved s/p EGD which showed esophageal dysmotility status post dilatation.  

Avoid cold fluid/foods and drink warm liquids specially before and with pills 

and meals.  





Chronic medical conditions of Asthma, IBS, Gastroesophageal reflux disease, 

Hypertension, Lower back spondylosis, Osteoarthritis , Restless leg syndrome , 

TIA, Neuritis, Gastroparesis/Nissen fundoplication, Arthritis, Anxiety, 

Migraine headache and Pre-cancerous uterine changes. Denies CHF, hypertension, 

diabetes mellitus, liver problems, kidney problems, DVT, PE, seizures, thyroid 

problems, or cancers.  Continue outpatient medications as appropriate


.


DVT prophylaxis 


-SCD.  Patient ambulatory


Discharge Planning


Stable for discharge








Paul Hassan MD Jul 16, 2017 09:57

## 2017-10-13 ENCOUNTER — HOSPITAL ENCOUNTER (OUTPATIENT)
Dept: HOSPITAL 17 - PLAB | Age: 57
End: 2017-10-13
Attending: FAMILY MEDICINE
Payer: COMMERCIAL

## 2017-10-13 DIAGNOSIS — E78.2: ICD-10-CM

## 2017-10-13 DIAGNOSIS — I25.10: Primary | ICD-10-CM

## 2017-10-13 DIAGNOSIS — I10: ICD-10-CM

## 2017-10-13 LAB
ALP SERPL-CCNC: 113 U/L (ref 45–117)
ALT SERPL-CCNC: 50 U/L (ref 10–53)
ANION GAP SERPL CALC-SCNC: 6 MEQ/L (ref 5–15)
AST SERPL-CCNC: 40 U/L (ref 15–37)
BILIRUB SERPL-MCNC: 0.6 MG/DL (ref 0.2–1)
BUN SERPL-MCNC: 9 MG/DL (ref 7–18)
CHLORIDE SERPL-SCNC: 109 MEQ/L (ref 98–107)
ERYTHROCYTE [DISTWIDTH] IN BLOOD BY AUTOMATED COUNT: 20.4 % (ref 11.6–17.2)
GFR SERPLBLD BASED ON 1.73 SQ M-ARVRAT: 75 ML/MIN (ref 89–?)
HCO3 BLD-SCNC: 26.4 MEQ/L (ref 21–32)
HCT VFR BLD CALC: 41.1 % (ref 35–46)
HDLC SERPL-MCNC: 48.2 MG/DL (ref 40–60)
LDLC SERPL DIRECT ASSAY-MCNC: 82 MG/DL (ref 0–99)
LDLC SERPL-MCNC: 64 MG/DL (ref 0–99)
MCH RBC QN AUTO: 29.5 PG (ref 27–34)
MCHC RBC AUTO-ENTMCNC: 32.4 % (ref 32–36)
MCV RBC AUTO: 90.9 FL (ref 80–100)
PLATELET # BLD: 254 TH/MM3 (ref 150–450)
POTASSIUM SERPL-SCNC: 4.8 MEQ/L (ref 3.5–5.1)
RBC # BLD AUTO: 4.52 MIL/MM3 (ref 4–5.3)
REVIEW FLAG: (no result)
SODIUM SERPL-SCNC: 141 MEQ/L (ref 136–145)
WBC # BLD AUTO: 7 TH/MM3 (ref 4–11)

## 2017-10-13 PROCEDURE — 85027 COMPLETE CBC AUTOMATED: CPT

## 2017-10-13 PROCEDURE — 80053 COMPREHEN METABOLIC PANEL: CPT

## 2017-10-13 PROCEDURE — 80061 LIPID PANEL: CPT

## 2017-10-13 PROCEDURE — 83721 ASSAY OF BLOOD LIPOPROTEIN: CPT

## 2017-11-17 ENCOUNTER — HOSPITAL ENCOUNTER (OUTPATIENT)
Dept: HOSPITAL 17 - CPRE | Age: 57
End: 2017-11-17
Attending: ORTHOPAEDIC SURGERY
Payer: COMMERCIAL

## 2017-11-17 DIAGNOSIS — Z01.818: ICD-10-CM

## 2017-11-17 DIAGNOSIS — Z01.811: ICD-10-CM

## 2017-11-17 DIAGNOSIS — Z79.01: ICD-10-CM

## 2017-11-17 DIAGNOSIS — Z96.60: ICD-10-CM

## 2017-11-17 DIAGNOSIS — R82.99: ICD-10-CM

## 2017-11-17 DIAGNOSIS — M25.50: ICD-10-CM

## 2017-11-17 DIAGNOSIS — Z01.810: Primary | ICD-10-CM

## 2017-11-17 DIAGNOSIS — Z01.812: ICD-10-CM

## 2017-11-17 LAB
ALP SERPL-CCNC: 126 U/L (ref 45–117)
ALT SERPL-CCNC: 34 U/L (ref 10–53)
ANION GAP SERPL CALC-SCNC: 9 MEQ/L (ref 5–15)
APTT BLD: 28.7 SEC (ref 24.3–30.1)
AST SERPL-CCNC: 27 U/L (ref 15–37)
BACTERIA #/AREA URNS HPF: (no result) /HPF
BASOPHILS # BLD AUTO: 0 TH/MM3 (ref 0–0.2)
BASOPHILS NFR BLD: 0.5 % (ref 0–2)
BILIRUB SERPL-MCNC: 0.3 MG/DL (ref 0.2–1)
BUN SERPL-MCNC: 8 MG/DL (ref 7–18)
CHLORIDE SERPL-SCNC: 101 MEQ/L (ref 98–107)
COLOR UR: (no result)
COMMENT (UR): (no result)
CULTURE IF INDICATED: (no result)
EOSINOPHIL # BLD: 0.1 TH/MM3 (ref 0–0.4)
EOSINOPHIL NFR BLD: 1.7 % (ref 0–4)
ERYTHROCYTE [DISTWIDTH] IN BLOOD BY AUTOMATED COUNT: 18.1 % (ref 11.6–17.2)
ERYTHROCYTE [SEDIMENTATION RATE] IN BLOOD BY WESTERGREN METHOD: 4 MM/HR (ref 0–30)
GFR SERPLBLD BASED ON 1.73 SQ M-ARVRAT: 67 ML/MIN (ref 89–?)
GLUCOSE UR STRIP-MCNC: (no result) MG/DL
HCO3 BLD-SCNC: 30.4 MEQ/L (ref 21–32)
HCT VFR BLD CALC: 43.4 % (ref 35–46)
HEMO FLAGS: (no result)
HGB UR QL STRIP: (no result)
INR PPP: 1.1 RATIO
KETONES UR STRIP-MCNC: (no result) MG/DL
LYMPHOCYTES # BLD AUTO: 1.9 TH/MM3 (ref 1–4.8)
LYMPHOCYTES NFR BLD AUTO: 25.8 % (ref 9–44)
MCH RBC QN AUTO: 31.6 PG (ref 27–34)
MCHC RBC AUTO-ENTMCNC: 33.7 % (ref 32–36)
MCV RBC AUTO: 93.9 FL (ref 80–100)
MONOCYTES NFR BLD: 8.7 % (ref 0–8)
MUCOUS THREADS #/AREA URNS LPF: (no result) /LPF
NEUTROPHILS # BLD AUTO: 4.6 TH/MM3 (ref 1.8–7.7)
NEUTROPHILS NFR BLD AUTO: 63.3 % (ref 16–70)
NITRITE UR QL STRIP: (no result)
PLATELET # BLD: 278 TH/MM3 (ref 150–450)
POTASSIUM SERPL-SCNC: 3.5 MEQ/L (ref 3.5–5.1)
PROTHROMBIN TIME: 11.7 SEC (ref 9.8–11.6)
RBC # BLD AUTO: 4.62 MIL/MM3 (ref 4–5.3)
RBC #/AREA URNS HPF: (no result) /HPF (ref 0–3)
SODIUM SERPL-SCNC: 140 MEQ/L (ref 136–145)
SP GR UR STRIP: 1 (ref 1–1.03)
SQUAMOUS #/AREA URNS HPF: (no result) /HPF (ref 0–5)
WBC # BLD AUTO: 7.3 TH/MM3 (ref 4–11)

## 2017-11-17 PROCEDURE — 81001 URINALYSIS AUTO W/SCOPE: CPT

## 2017-11-17 PROCEDURE — 36415 COLL VENOUS BLD VENIPUNCTURE: CPT

## 2017-11-17 PROCEDURE — 85652 RBC SED RATE AUTOMATED: CPT

## 2017-11-17 PROCEDURE — 87086 URINE CULTURE/COLONY COUNT: CPT

## 2017-11-17 PROCEDURE — 85610 PROTHROMBIN TIME: CPT

## 2017-11-17 PROCEDURE — 85730 THROMBOPLASTIN TIME PARTIAL: CPT

## 2017-11-17 PROCEDURE — 71020: CPT

## 2017-11-17 PROCEDURE — 93005 ELECTROCARDIOGRAM TRACING: CPT

## 2017-11-17 PROCEDURE — 85025 COMPLETE CBC W/AUTO DIFF WBC: CPT

## 2017-11-17 PROCEDURE — 80053 COMPREHEN METABOLIC PANEL: CPT

## 2017-11-17 NOTE — EKG
Date Performed: 11/17/2017       Time Performed: 10:23:20

 

PTAGE:      57 years

 

EKG:      Sinus rhythm 

 

 POSSIBLE LEFT ATRIAL ENLARGEMENT NONSPECIFIC ST & T-WAVE ABNORMALITY BORDERLINE ECG

 

PREVIOUS TRACING        07/14/2017 06.07.22 Compared to previous tracing, there is some improvement i
n the T-wave change anteroseptally.

 

DOCTOR:   Romel Pena  Interpretating Date/Time  11/17/2017 18:34:25

## 2017-11-17 NOTE — RADRPT
EXAM DATE/TIME:  11/17/2017 11:21 

 

[facility] COMPARISON:     

CHEST PA & LAT, December 02, 2016, 12:29.

 

                     

INDICATIONS :     

Evaluate for pneumonia, pneumothorax or communicable disease. Pre op for left hip surgery 12-4-17

                     

 

MEDICAL HISTORY :     

Hypertension.       asthma, hiatal hernia, gastroparesis   

 

SURGICAL HISTORY :     

CABG.   loop recorder, hernia repair, nissen fundiplication

 

ENCOUNTER:     

Initial                                        

 

ACUITY:     

1 day      

 

PAIN SCORE:     

0/10

 

LOCATION:     

Bilateral chest 

 

FINDINGS:     

PA and lateral views of the chest demonstrate the lungs to be symmetrically aerated without evidence 
of mass, infiltrate or effusion.  The cardiomediastinal contours are unremarkable.  Osseous structure
s are intact. Patient is again noted be status post median sternotomy with multiple sternal wires and
 screw plate fixation devices. There is a loop recorder.

 

CONCLUSION:     No acute disease.  

 

 

 

 Artur Gloria MD on November 17, 2017 at 11:54           

Board Certified Radiologist.

 This report was verified electronically.

## 2017-12-01 ENCOUNTER — HOSPITAL ENCOUNTER (OUTPATIENT)
Dept: HOSPITAL 17 - PLAB | Age: 57
End: 2017-12-01
Attending: FAMILY MEDICINE
Payer: COMMERCIAL

## 2017-12-01 DIAGNOSIS — I25.10: ICD-10-CM

## 2017-12-01 DIAGNOSIS — I10: ICD-10-CM

## 2017-12-01 DIAGNOSIS — E78.2: Primary | ICD-10-CM

## 2017-12-01 LAB
ALP SERPL-CCNC: 117 U/L (ref 45–117)
ALT SERPL-CCNC: 30 U/L (ref 10–53)
ANION GAP SERPL CALC-SCNC: 7 MEQ/L (ref 5–15)
AST SERPL-CCNC: 23 U/L (ref 15–37)
BILIRUB SERPL-MCNC: 0.4 MG/DL (ref 0.2–1)
BUN SERPL-MCNC: 9 MG/DL (ref 7–18)
CHLORIDE SERPL-SCNC: 103 MEQ/L (ref 98–107)
ERYTHROCYTE [DISTWIDTH] IN BLOOD BY AUTOMATED COUNT: 16.4 % (ref 11.6–17.2)
GFR SERPLBLD BASED ON 1.73 SQ M-ARVRAT: 55 ML/MIN (ref 89–?)
HCO3 BLD-SCNC: 30.2 MEQ/L (ref 21–32)
HCT VFR BLD CALC: 40.2 % (ref 35–46)
LDLC SERPL DIRECT ASSAY-MCNC: 89 MG/DL (ref 0–99)
MCH RBC QN AUTO: 31.8 PG (ref 27–34)
MCHC RBC AUTO-ENTMCNC: 33.7 % (ref 32–36)
MCV RBC AUTO: 94.3 FL (ref 80–100)
PLATELET # BLD: 242 TH/MM3 (ref 150–450)
POTASSIUM SERPL-SCNC: 4.4 MEQ/L (ref 3.5–5.1)
RBC # BLD AUTO: 4.26 MIL/MM3 (ref 4–5.3)
REVIEW FLAG: (no result)
SODIUM SERPL-SCNC: 140 MEQ/L (ref 136–145)
WBC # BLD AUTO: 7.5 TH/MM3 (ref 4–11)

## 2017-12-01 PROCEDURE — 85027 COMPLETE CBC AUTOMATED: CPT

## 2017-12-01 PROCEDURE — 83721 ASSAY OF BLOOD LIPOPROTEIN: CPT

## 2017-12-01 PROCEDURE — 80053 COMPREHEN METABOLIC PANEL: CPT

## 2017-12-04 ENCOUNTER — HOSPITAL ENCOUNTER (INPATIENT)
Dept: HOSPITAL 17 - HSDI | Age: 57
LOS: 3 days | Discharge: HOME HEALTH | DRG: 470 | End: 2017-12-07
Attending: ORTHOPAEDIC SURGERY | Admitting: ORTHOPAEDIC SURGERY
Payer: COMMERCIAL

## 2017-12-04 VITALS
DIASTOLIC BLOOD PRESSURE: 58 MMHG | TEMPERATURE: 97.7 F | SYSTOLIC BLOOD PRESSURE: 118 MMHG | HEART RATE: 96 BPM | OXYGEN SATURATION: 100 % | RESPIRATION RATE: 17 BRPM

## 2017-12-04 VITALS
RESPIRATION RATE: 18 BRPM | SYSTOLIC BLOOD PRESSURE: 124 MMHG | TEMPERATURE: 96.7 F | DIASTOLIC BLOOD PRESSURE: 64 MMHG | HEART RATE: 87 BPM | OXYGEN SATURATION: 100 %

## 2017-12-04 VITALS
SYSTOLIC BLOOD PRESSURE: 109 MMHG | OXYGEN SATURATION: 100 % | DIASTOLIC BLOOD PRESSURE: 58 MMHG | RESPIRATION RATE: 18 BRPM | TEMPERATURE: 96 F | HEART RATE: 82 BPM

## 2017-12-04 VITALS — HEIGHT: 66 IN | WEIGHT: 209.44 LBS | BODY MASS INDEX: 33.66 KG/M2

## 2017-12-04 DIAGNOSIS — Z96.641: ICD-10-CM

## 2017-12-04 DIAGNOSIS — E78.5: ICD-10-CM

## 2017-12-04 DIAGNOSIS — Z86.73: ICD-10-CM

## 2017-12-04 DIAGNOSIS — I25.10: ICD-10-CM

## 2017-12-04 DIAGNOSIS — K58.9: ICD-10-CM

## 2017-12-04 DIAGNOSIS — Z88.0: ICD-10-CM

## 2017-12-04 DIAGNOSIS — R73.9: ICD-10-CM

## 2017-12-04 DIAGNOSIS — D62: ICD-10-CM

## 2017-12-04 DIAGNOSIS — F41.9: ICD-10-CM

## 2017-12-04 DIAGNOSIS — D72.829: ICD-10-CM

## 2017-12-04 DIAGNOSIS — K31.84: ICD-10-CM

## 2017-12-04 DIAGNOSIS — M16.12: Primary | ICD-10-CM

## 2017-12-04 DIAGNOSIS — J45.909: ICD-10-CM

## 2017-12-04 DIAGNOSIS — Z95.1: ICD-10-CM

## 2017-12-04 DIAGNOSIS — I48.91: ICD-10-CM

## 2017-12-04 DIAGNOSIS — I48.92: ICD-10-CM

## 2017-12-04 DIAGNOSIS — M32.9: ICD-10-CM

## 2017-12-04 DIAGNOSIS — I10: ICD-10-CM

## 2017-12-04 PROCEDURE — 85027 COMPLETE CBC AUTOMATED: CPT

## 2017-12-04 PROCEDURE — C1776 JOINT DEVICE (IMPLANTABLE): HCPCS

## 2017-12-04 PROCEDURE — 86900 BLOOD TYPING SEROLOGIC ABO: CPT

## 2017-12-04 PROCEDURE — C9290 INJ, BUPIVACAINE LIPOSOME: HCPCS

## 2017-12-04 PROCEDURE — 80048 BASIC METABOLIC PNL TOTAL CA: CPT

## 2017-12-04 PROCEDURE — 86920 COMPATIBILITY TEST SPIN: CPT

## 2017-12-04 PROCEDURE — 86850 RBC ANTIBODY SCREEN: CPT

## 2017-12-04 PROCEDURE — 0SRB04A REPLACEMENT OF LEFT HIP JOINT WITH CERAMIC ON POLYETHYLENE SYNTHETIC SUBSTITUTE, UNCEMENTED, OPEN APPROACH: ICD-10-PCS | Performed by: ORTHOPAEDIC SURGERY

## 2017-12-04 PROCEDURE — 36430 TRANSFUSION BLD/BLD COMPNT: CPT

## 2017-12-04 PROCEDURE — 84155 ASSAY OF PROTEIN SERUM: CPT

## 2017-12-04 PROCEDURE — 86901 BLOOD TYPING SEROLOGIC RH(D): CPT

## 2017-12-04 PROCEDURE — 76000 FLUOROSCOPY <1 HR PHYS/QHP: CPT

## 2017-12-04 PROCEDURE — 73502 X-RAY EXAM HIP UNI 2-3 VIEWS: CPT

## 2017-12-04 PROCEDURE — P9016 RBC LEUKOCYTES REDUCED: HCPCS

## 2017-12-04 RX ADMIN — IPRATROPIUM BROMIDE SCH MG: 0.5 SOLUTION RESPIRATORY (INHALATION) at 20:00

## 2017-12-04 RX ADMIN — PHENYTOIN SODIUM SCH MLS/HR: 50 INJECTION INTRAMUSCULAR; INTRAVENOUS at 16:52

## 2017-12-04 RX ADMIN — HYDROCODONE BITARTRATE AND ACETAMINOPHEN PRN TAB: 7.5; 325 TABLET ORAL at 11:15

## 2017-12-04 RX ADMIN — MONTELUKAST SODIUM SCH MG: 10 TABLET, COATED ORAL at 22:21

## 2017-12-04 RX ADMIN — GABAPENTIN SCH MG: 300 CAPSULE ORAL at 22:21

## 2017-12-04 RX ADMIN — FOLIC ACID SCH MG: 1 TABLET ORAL at 22:20

## 2017-12-04 RX ADMIN — SPIRONOLACTONE SCH MG: 25 TABLET, FILM COATED ORAL at 10:00

## 2017-12-04 RX ADMIN — MORPHINE SULFATE PRN MG: 2 INJECTION, SOLUTION INTRAMUSCULAR; INTRAVENOUS at 22:21

## 2017-12-04 RX ADMIN — VANCOMYCIN HYDROCHLORIDE SCH MLS/HR: 1 INJECTION, SOLUTION INTRAVENOUS at 20:54

## 2017-12-04 RX ADMIN — GABAPENTIN SCH MG: 300 CAPSULE ORAL at 11:14

## 2017-12-04 RX ADMIN — HYDROCODONE BITARTRATE AND ACETAMINOPHEN PRN TAB: 7.5; 325 TABLET ORAL at 17:17

## 2017-12-04 RX ADMIN — PHENYTOIN SODIUM SCH MLS/HR: 50 INJECTION INTRAMUSCULAR; INTRAVENOUS at 09:50

## 2017-12-04 RX ADMIN — ONDANSETRON PRN MG: 2 INJECTION, SOLUTION INTRAMUSCULAR; INTRAVENOUS at 22:21

## 2017-12-04 RX ADMIN — ALBUTEROL SULFATE SCH MG: 1.25 SOLUTION RESPIRATORY (INHALATION) at 20:00

## 2017-12-04 NOTE — RADRPT
EXAM DATE/TIME:  12/04/2017 09:56 

 

[facility] COMPARISON:     

No previous studies available for comparison.

 

                     

INDICATIONS :     

Post-op left hip replacement.

                     

 

MEDICAL HISTORY :     

Hypertension.       asthma, hiatal hernia, gastroparesis   

 

SURGICAL HISTORY :     

CABG.   left hip replacement, loop recorder, hernia repair, nissen fundiplication

 

ENCOUNTER:     

Initial                                        

 

ACUITY:     

1 day      

 

PAIN SCORE:     

0/10

 

LOCATION:     

Left  Hip

 

FINDINGS:     

AP views of the pelvis with 2 views of the left hip joint demonstrates bilateral total of arthroplast
y hardware. The left hip joint has been recently replaced. There is a single acetabular screw visuali
zed. The femoral component is noncemented and contains no medial collar. There is soft tissue air rosy
rounding the left hip joint, as expected. Visualized pelvic bones demonstrate no acute finding.

 

CONCLUSION:     

Expected changes following left total hip arthroplasty.

 

 

 

 Ayden Pate MD on December 04, 2017 at 11:20           

Board Certified Radiologist.

 This report was verified electronically.

## 2017-12-04 NOTE — PD.CONS
HPI


Service


[facility]


Consult Requested By


Dr. Chappell


Reason for Consult


Medical management


Primary Care Physician


Terry Casiano MD


Diagnoses:  


History of Present Illness


57-year-old female with a past medical history of systemic lupus erythematous, 

CAD, asthma, hyperlipidemia, hypertension, atrial fibrillation/flutter, IBS, 

gastroparesis, osteoarthritis and history of TIA  is postop day 0 status post 

left total hip arthroplasty.  She has no complaints at this time.  Afebrile, 

vital signs stable.





Review of Systems


Denies fever or chills


Denies blurry vision, otorrhea, rhinorrhea 


Denies sore throat and cough


No chest pain, palpitations, shortness of breath


No abdominal pain


Denies constipation/diarrhea/nausea/vomiting


Denies muscle pain/weakness


No rashes





Past Family Social History


Allergies:  


Coded Allergies:  


     amlodipine (Verified  Allergy, Severe, RASH ON LEGS, EDEMA, 12/4/17)


 FOGGY BRAIN


     fluticasone (Verified  Allergy, Severe, Shortness of Breath, 12/4/17)


     fluticasone furoate (Verified  Allergy, Severe, Shortness of Breath, 12/4/ 17)


     lovastatin (Verified  Allergy, Severe, COUGH, BREATHING DIFFICULTY, HEADACH

, 12/4/17)


     metoclopramide (Verified  Allergy, Severe, NEUROLOGICAL EFFECT, WORSENS 

RESTLEG AND CAUSES TREMOR, 12/4/17)


 NEUROLOGICAL EFFECT, WORSENS RESTLEG AND CAUSES TREMOR


     penicillin G (Verified  Allergy, Severe, Anaphylaxis, 12/4/17)


     pravastatin (Verified  Allergy, Severe, MUSCLE ACHES, JOINT PAIN, 

SENSITIVE SKIN, 12/4/17)


 FOGGY BRAIN


     salmeterol (Verified  Allergy, Severe, Shortness of Breath, 12/4/17)


     simvastatin (Verified  Allergy, Severe, MUSCLE ACHES, JOINT PAIN, 

SENSITIVE SKIN, 12/4/17)


 FOGGY BRAIN


     adhesive (Verified  Allergy, Mild, RASH, 12/4/17)


 MAY USE PAPER TAPE ONLY AND TEGADERM


     hydroxychloroquine (Verified  Adverse Reaction, Severe, MUSCLE ACHING, 12/4 /17)


 VISUAL DISTURBANCES


     atenolol (Verified  Adverse Reaction, Intermediate, SHORTNESS OF BREATH , 

SEVERE LETHARGIC AND EXHUSTED, WEIGHT , 12/4/17)


 PATIENT REPORTS SHE STOPPED TAKING THIS MEDICATION 5 WEEKS


 AGO - SHE WAS SLOWLY WEANED OFF PER DR. JOLLEY


     atorvastatin (Verified  Adverse Reaction, Intermediate, NEURALGIA, 12/4/17)


 FOGGY BRAIN


Uncoded Allergies:  


     NICKEL (Allergy, Intermediate, RASH, BURN, 6/15/15)


 CONFIRMED 8/29/14 TM


     STERI STRIPS (Allergy, Intermediate, RASH, BURN, 6/15/15)


 CONFIRMED 8/29/14 TM


Past Medical History


Anxiety


Asthma


CAD status post CABG 1


Hyperlipidemia


History of atrial fibrillation/flutter with current loop recorder


IBS


SLE


History of TIA in 2007


History of uterine fibroids


History of iron deficiency anemia


Past Surgical History


Cholecystectomy


Hernia repair


Tubal ligation


Right hip arthroplasty in 2016


CABG 1 LAD in 2013


Left shoulder rotator cuff repair in 2007


Nissen fundoplication in 2002


ALISON/BSO in 1994


Bilateral carpal tunnel release in 1991


Appendectomy in 1970


Reported Medications





Reported Meds & Active Scripts


Active


Norco (Hydrocodone-Acetaminophen) 7.5-325 mg Tab 1-2 Tab PO Q6H PRN


Aspirin Low Dose (Aspirin) 81 Mg Chew 81 Mg CHEW BID 30 Days


Lovenox Inj (Enoxaparin Sodium) 40 Mg/0.4 Ml Syr 40 Mg SQ DAILY


Potassium Chloride Microencaps 20 Meq Tab 20 Meq PO DAILY


Reported


Ambien (Zolpidem Tartrate) 10 Mg Tab 10 Mg PO HS PRN


Spironolactone 25 Mg Tab 12.5 Mg PO DAILY


Clindamycin (Clindamycin HCl) 300 Mg Cap 600 Mg PO ONCE PRN


     4 hours prior to dental procedure


Transderm-Scop (Scopolamine) 1 Mg/3 Days Dis 1 Patch T-DERMAL Q3D PRN


Dexamethasone 4 Mg Tab 4 Mg PO AS DIRECTED


     5 tabs 3x daily for 5-7 days then wean down


Lidoderm (Lidocaine) 5 % Adh..patch 1 Patch T-DERMAL Q12HR PRN


Folic Acid 1 Mg Tablet 1 Mg PO HS


Tylenol (Acetaminophen) 325 Mg Tab 650 Mg PO HS PRN


Aspirin Adult Low Strength (Aspirin) 81 Mg Tabdr 81 Mg PO DAILY


Gabapentin 600 Mg Tab 600 Mg PO BID


Tessalon Perles (Benzonatate) 100 Mg Cap 200 Mg PO TID PRN


Zofran Odt (Ondansetron Odt) 8 Mg Tab 8 Mg SL Q8H PRN


Butorphanol Nasal Spray (Butorphanol Tartrate) 10 Mg/Ml Soln 1 Spray NASAL Q4HR 

PRN


     in one nostril (1 mg). If pain not reliefed in 60-90 minutes, a 1 mg


     repeat dose may be given.  May repeat this in 3-4 hrs if needed.


Epipen 2-Frank Inj (Epinephrine) 0.3 Mg/0.3 Ml Pfpen 0.3 Mg IM ONCE PRN


Xopenex Hfa 15 GM Inh (Levalbuterol 15 GM Inh) 45 Mcg/Act Aer 45 Mcg INH Q6HR


     Shake well before using. (1 puff = 45 mcg)


Xopenex Neb (Levalbuterol HCl) 0.63 Mg/3 Ml Neb 0.63 Mg NEB QID


Ipratropium Neb (Ipratropium Bromide) 0.5 Mg/2.5 Ml Amp 0.5 Mg NEB Q6HR NEB


Amlodipine (Amlodipine Besylate) 2.5 Mg Tab 2.5 Mg PO DAILY PRN


Singulair (Montelukast Sodium) 10 Mg Tab 10 Mg PO HS


Incruse Ellipta Inh (Umeclidinium Bromide Inh) 0.0625 Mg/Act Inh 1 Puff INH 

DAILY


Methotrexate 2.5 Mg Tab 15 Mg PO WEEKLY


     every friday


Tramadol (Tramadol HCl) 50 Mg Tab 50 Mg PO QID


Cartia Xt (Diltiazem ER 24 HR) 120 Mg Caper 120 Mg PO DAILY


Azithromycin 250 Mg Tab 250 Mg PO HS


     On for 1 week, off for 1 week


Protonix (Pantoprazole Sodium) 40 Mg Tab 40 Mg PO HS


Family History


Mother with CVA


Social History


Never smoker.  Rare alcohol use.  Denies illicit drugs.





Physical Exam


Vital Signs





Vital Signs








  Date Time  Temp Pulse Resp B/P (MAP) Pulse Ox O2 Delivery O2 Flow Rate FiO2


 


12/4/17 10:40 98.1 80 20 115/56 (75) 100 Nasal Cannula 2 


 


12/4/17 10:30  80 20 115/56 (75) 100 Nasal Cannula 2 


 


12/4/17 10:15  65 20 119/56 (77) 100 Nasal Cannula 2 


 


12/4/17 10:00  72 20 103/54 (70) 100 Nasal Cannula 2 


 


12/4/17 09:38 98.1 82 20 98/56 (70) 94 Nasal Cannula 2 


 


12/4/17 06:20 98.1 75 20 135/76 (95) 98   








Physical Exam


GENERAL:  female lying in bed


SKIN: No rashes, ecchymoses or lesions. Cool and dry.


HEAD: Atraumatic. Normocephalic. No temporal or scalp tenderness.


EYES: Pupils equal round and reactive. Extraocular motions intact. No scleral 

icterus. No injection or drainage. 


ENT: Nose without bleeding, purulent drainage or septal hematoma. Throat 

without erythema, tonsillar hypertrophy or exudate. Uvula midline. Airway 

patent.


NECK: Trachea midline. No JVD or lymphadenopathy. Supple, nontender, no 

meningeal signs.


CARDIOVASCULAR: Regular rate and rhythm without murmurs, gallops, or rubs. 


RESPIRATORY: Clear to auscultation. Breath sounds equal bilaterally. No wheezes

, rales, or rhonchi.  


GASTROINTESTINAL: Abdomen soft, non-tender, nondistended. No hepato-splenomegaly

, or palpable masses. No guarding.


MUSCULOSKELETAL: Extremities without clubbing, cyanosis, or edema. No joint 

tenderness, effusion, or edema noted. No calf tenderness. 


NEUROLOGICAL: Awake and alert. Cranial nerves II through XII intact.  Motor and 

sensory grossly within normal limits. Normal speech.





Assessment and Plan


Assessment and Plan


57-year-old female with a past medical history of systemic lupus erythematous, 

CAD, asthma, hyperlipidemia, hypertension, atrial fibrillation/flutter, IBS, 

gastroparesis, osteoarthritis and history of TIA  is postop day 0 status post 

left total hip arthroplasty.





1.  Left hip arthroplasty on 12/4/17


Management per orthopedics





2.  Atrial fibrillation/flutter


CAD


HTN


Regular rate and rhythm currently


Continue home diltiazem and metoprolol


Continue home amlodipine


Continue spironolactone


ASA





3.  Asthma


Duo nebs when necessary





Anticoagulation per orthopedic surgery


Follow-up BMP in the Natividad Rea MD Dec 4, 2017 12:28

## 2017-12-04 NOTE — RADRPT
EXAM DATE/TIME:  12/04/2017 07:18 

 

[facility] COMPARISON:     

No previous studies available for comparison.

 

                     

INDICATIONS :     

Left total hip replacement.

                     

 

MEDICAL HISTORY :            

Hypertension. asthma, hiatal hernia, gastroparesis   

 

SURGICAL HISTORY :        

CABG. loop recorder, hernia repair, nissen fundiplication

 

ENCOUNTER:     

Initial                                        

 

ACUITY:     

1 day      

 

PAIN SCORE:     

Non-responsive.

LOCATION:     Left  Hip

 

FINDINGS:               

Examination of the hip demonstrates total hip arthroplasty in satisfactory position. The alignment is
 anatomic.

 

CONCLUSION:     Post surgical changes as above.

 

 

 Tono Santana MD on December 04, 2017 at 11:58           

Board Certified Radiologist.

 This report was verified electronically.

## 2017-12-04 NOTE — HHI.PR
Subjective


Remarks


NOT SEEN





Objective


Vitals





Vital Signs








  Date Time  Temp Pulse Resp B/P (MAP) Pulse Ox O2 Delivery O2 Flow Rate FiO2


 


12/4/17 18:22   16     


 


12/4/17 16:00 96.7 87 18 124/64 (84) 100   


 


12/4/17 11:20 96.0 82 18 109/58 (75) 100   


 


12/4/17 10:40 98.1 80 20 115/56 (75) 100 Nasal Cannula 2 


 


12/4/17 10:30  80 20 115/56 (75) 100 Nasal Cannula 2 


 


12/4/17 10:15  65 20 119/56 (77) 100 Nasal Cannula 2 


 


12/4/17 10:00  72 20 103/54 (70) 100 Nasal Cannula 2 


 


12/4/17 09:38 98.1 82 20 98/56 (70) 94 Nasal Cannula 2 


 


12/4/17 06:20 98.1 75 20 135/76 (95) 98   














I/O      


 


 12/3/17 12/3/17 12/3/17 12/4/17 12/4/17 12/4/17





 07:00 15:00 23:00 07:00 15:00 23:00


 


Intake Total     2000 ml 


 


Output Total     4200 ml 


 


Balance     -2200 ml 


 


      


 


Intake IV Total     2000 ml 


 


Output Urine Total     600 ml 


 


Estimated Blood Loss     600 ml 


 


Other     3000 ml 








Imaging





Last Impressions








Hip and Pelvis X-Ray 12/4/17 0652 Signed





Impressions: 





 Service Date/Time:  Monday, December 4, 2017 09:56 - CONCLUSION:  Expected 





 changes following left total hip arthroplasty.     Ayden Pate MD 


 


Hip X-Ray 12/4/17 0000 Signed





Impressions: 





 Service Date/Time:  Monday, December 4, 2017 07:18 - CONCLUSION: Post surgical 





 changes as above.    Tono Santana MD 








Objective Remarks


GENERAL:  female lying in bed


SKIN: No rashes, ecchymoses or lesions. Cool and dry.


HEAD: Atraumatic. Normocephalic. No temporal or scalp tenderness.


EYES: Pupils equal round and reactive. Extraocular motions intact. No scleral 

icterus. No injection or drainage. 


ENT: Nose without bleeding, purulent drainage or septal hematoma. Throat 

without erythema, tonsillar hypertrophy or exudate. Uvula midline. Airway 

patent.


NECK: Trachea midline. No JVD or lymphadenopathy. Supple, nontender, no 

meningeal signs.


CARDIOVASCULAR: Regular rate and rhythm without murmurs, gallops, or rubs. 


RESPIRATORY: Clear to auscultation. Breath sounds equal bilaterally. No wheezes

, rales, or rhonchi.  


GASTROINTESTINAL: Abdomen soft, non-tender, nondistended. No guarding.


MUSCULOSKELETAL: Extremities without clubbing, cyanosis, or edema. No joint 

tenderness, effusion, or edema noted. No calf tenderness. 


NEUROLOGICAL: Awake and alert. Cranial nerves II through XII intact.  Motor and 

sensory grossly within normal limits. Normal speech.





A/P


Assessment and Plan


57-year-old female with a past medical history of systemic lupus erythematous, 

CAD, asthma, hyperlipidemia, hypertension, atrial fibrillation/flutter, IBS, 

gastroparesis, osteoarthritis and history of TIA  is postop day 0 status post 

left total hip arthroplasty.





1.  Left hip arthroplasty on 12/4/17


Management per orthopedics





2.  Atrial fibrillation/flutter


TIA


CAD


HTN


HLD


Regular rate and rhythm currently


Continue home diltiazem 


Continue spironolactone


ASA when ok by ortho





3.  Asthma


Duo nebs when necessary





Anticoagulation per orthopedic surgery


Follow-up BMP in the a.m.











Paul Hassan MD Dec 4, 2017 18:40

## 2017-12-04 NOTE — MP
cc:

MIQUEL MATHEWS

****

 

 

DATE OF SURGERY

12/04/2017

 

PREOPERATIVE DIAGNOSIS

Left hip osteoarthritis.

 

POSTOPERATIVE DIAGNOSIS

Left hip osteoarthritis.

 

PROCEDURE

Left total hip arthroplasty.

 

SURGEON

Miquel Mathews MD

 

FIRST ASSISTANT

Deandre Churchill PA-C

 

ANESTHESIA

General.

 

ESTIMATED BLOOD LOSS

300 cc.

 

COMPLICATIONS

None.

 

IMPLANTS USED

DePuy Corail size 12 Press-Fit standard offset femoral stem, size 54 [place]

Gription cup, size 36-mm highly cross-linked polyethylene neutral liner, size

36-mm ceramic head, +12 neck.

 

JUSTIFICATION

This patient is 57-year-old female who has significant severe end-stage

osteoarthritis involving the left hip.  She has severe disabling pain with

standing, walking, ambulation, weight-bearing activities and severe pain at

rest.  She has failed greater than three months of nonoperative conservative

treatment to include medication therapy, ambulatory assistive aids, home

exercise program, activity modification as well as weight loss attempts.

 

X-rays of the left hip reveal severe end-stage osteoarthritis with joint space

narrowing, subchondral sclerosis and bone-on-bone joint space narrowing,

subchondral sclerosis, subchondral cysts, osteophyte formation and subluxation.

The patient was counseled as to the risks, benefits and alternatives to a left

total hip arthroplasty.  The risks were discussed which include but are not

limited to anesthesia, bleeding, infection, damage to nerves, blood vessels,

pain, stiffness, fracture dislocation, leg length discrepancy, blood clots,

pulmonary embolism and even death.  The patient's pain is severe.  She favored

the benefits over the risks and she did wish to proceed with surgery.

 

PROCEDURE IN DETAIL

Written consent was obtained.  The patient was identified by name and taken to

the operating room, placed supine on the operating room table.  General

anesthesia was administered as well as 900 mg of IV clindamycin, 1 grams of IV

vancomycin.  She does have a PENICILLIN ALLERGY.  The left and right feet were

placed in the padded traction boots.  The left hip and left lower extremity

were prepped and draped using isopropyl alcohol, Hibiclens solution and

Chloraprep solution.

 

After time-out was performed, a longitudinal incision was made over the

anterolateral aspect of the left hip.  The fascial layer was incised.

Dissection was carried over the tensor fascia jenna, beneath the rectus femoris

to allow exposure to the anterior hip capsule.  A capsulotomy incision was

performed.  An oscillating saw was used to perform a femoral neck cut.  The

osteoarthritic femoral head and neck component was removed.  A 10 blade scalpel

was used to excise the labrum.

 

Sequential reaming began at size 47 and was carried through a size 54.

Subsequently, a size 64 [place] Gription cup was implanted in approximately 45

degrees of abduction and 10 degrees of anteversion.  I placed a single 35-mm

screw in the posterior superior quadrant for additional purchase and fixation

of the cup.  The 36-mm neutral highly cross-linked polyethylene liner was then

impacted in place and tested for stability.

 

Attention was then turned to the femur where the leg was externally rotated,

extended and adducted.  The capsule was released off the inner surface of the

greater trochanter.  This allowed for elevation and lateralization of the

femur.  A box-cutting osteotome was used to gain entrance into the

intramedullary canal of the femur.  This was followed by a canal finder,

sequential broaching up to size 11.  Trial head and neck combinations were

evaluated and the final components implanted.  With the current components the

leg could achieve external rotation to 70 degrees and extension all the way

down to the ground without evidence of anterior instability or impingement.

Fluoroscopy showed appropriate implantation of components.

 

The surgical wound was thoroughly irrigated with sterile saline pulse lavage

antibiotic impregnated solution.  The fascial layer was closed with #1 Vicryl

suture, the subcutaneous layer with 2-0 Vicryl suture. The skin was closed with

Dermabond.  Sterile dressings were applied.  The patient tolerated the

procedure well with no intraoperative complications noted.

 

 

Deandre Churchill, physician assistant certified, was present during the entire

procedure to include patient positioning and the procedure itself.  The medical

necessity of a physician assistant was indicated in this case due to the

complexity of the procedure.  He assisted with appropriate manipulation of the

leg and also retraction of muscle, tendon, bone and neurovascular structures.

He assisted with preparation of the bone, also implantation of the prosthetic

replacement.

 

 

                              _________________________________

                              MD VON Piña/LUZ ELENA

D:  12/4/2017/9:17 AM

T:  12/4/2017/9:21 AM

Visit #:  [id number]

Job #:  [id number]

## 2017-12-05 VITALS
TEMPERATURE: 96.9 F | HEART RATE: 79 BPM | DIASTOLIC BLOOD PRESSURE: 69 MMHG | OXYGEN SATURATION: 96 % | SYSTOLIC BLOOD PRESSURE: 116 MMHG | RESPIRATION RATE: 17 BRPM

## 2017-12-05 VITALS
HEART RATE: 72 BPM | TEMPERATURE: 97.4 F | SYSTOLIC BLOOD PRESSURE: 119 MMHG | RESPIRATION RATE: 17 BRPM | DIASTOLIC BLOOD PRESSURE: 67 MMHG | OXYGEN SATURATION: 99 %

## 2017-12-05 VITALS
HEART RATE: 98 BPM | DIASTOLIC BLOOD PRESSURE: 77 MMHG | OXYGEN SATURATION: 99 % | SYSTOLIC BLOOD PRESSURE: 136 MMHG | RESPIRATION RATE: 18 BRPM | TEMPERATURE: 97.3 F

## 2017-12-05 VITALS
OXYGEN SATURATION: 99 % | HEART RATE: 87 BPM | TEMPERATURE: 98.6 F | SYSTOLIC BLOOD PRESSURE: 116 MMHG | DIASTOLIC BLOOD PRESSURE: 60 MMHG | RESPIRATION RATE: 18 BRPM

## 2017-12-05 VITALS
DIASTOLIC BLOOD PRESSURE: 58 MMHG | TEMPERATURE: 97.9 F | OXYGEN SATURATION: 100 % | HEART RATE: 99 BPM | SYSTOLIC BLOOD PRESSURE: 118 MMHG | RESPIRATION RATE: 18 BRPM

## 2017-12-05 VITALS
HEART RATE: 84 BPM | SYSTOLIC BLOOD PRESSURE: 105 MMHG | TEMPERATURE: 97.8 F | DIASTOLIC BLOOD PRESSURE: 58 MMHG | OXYGEN SATURATION: 95 % | RESPIRATION RATE: 18 BRPM

## 2017-12-05 VITALS
HEART RATE: 86 BPM | OXYGEN SATURATION: 97 % | RESPIRATION RATE: 18 BRPM | DIASTOLIC BLOOD PRESSURE: 56 MMHG | SYSTOLIC BLOOD PRESSURE: 117 MMHG | TEMPERATURE: 98.8 F

## 2017-12-05 LAB
ANION GAP SERPL CALC-SCNC: 8 MEQ/L (ref 5–15)
BUN SERPL-MCNC: 12 MG/DL (ref 7–18)
CALCIUM TP COR SERPL-MCNC: 8.3 MG/DL (ref 8.5–10.1)
CHLORIDE SERPL-SCNC: 106 MEQ/L (ref 98–107)
ERYTHROCYTE [DISTWIDTH] IN BLOOD BY AUTOMATED COUNT: 15.8 % (ref 11.6–17.2)
GFR SERPLBLD BASED ON 1.73 SQ M-ARVRAT: 79 ML/MIN (ref 89–?)
HCO3 BLD-SCNC: 25.1 MEQ/L (ref 21–32)
HCT VFR BLD CALC: 24.5 % (ref 35–46)
MCH RBC QN AUTO: 31.2 PG (ref 27–34)
MCHC RBC AUTO-ENTMCNC: 33.2 % (ref 32–36)
MCV RBC AUTO: 94 FL (ref 80–100)
PLATELET # BLD: 191 TH/MM3 (ref 150–450)
POTASSIUM SERPL-SCNC: 3.9 MEQ/L (ref 3.5–5.1)
RBC # BLD AUTO: 2.61 MIL/MM3 (ref 4–5.3)
REVIEW FLAG: (no result)
SODIUM SERPL-SCNC: 139 MEQ/L (ref 136–145)
WBC # BLD AUTO: 13.9 TH/MM3 (ref 4–11)

## 2017-12-05 RX ADMIN — GABAPENTIN SCH MG: 300 CAPSULE ORAL at 08:54

## 2017-12-05 RX ADMIN — HYDROCODONE BITARTRATE AND ACETAMINOPHEN PRN TAB: 7.5; 325 TABLET ORAL at 05:59

## 2017-12-05 RX ADMIN — IPRATROPIUM BROMIDE SCH MG: 0.5 SOLUTION RESPIRATORY (INHALATION) at 16:00

## 2017-12-05 RX ADMIN — FOLIC ACID SCH MG: 1 TABLET ORAL at 19:26

## 2017-12-05 RX ADMIN — GABAPENTIN SCH MG: 300 CAPSULE ORAL at 19:25

## 2017-12-05 RX ADMIN — MORPHINE SULFATE PRN MG: 2 INJECTION, SOLUTION INTRAMUSCULAR; INTRAVENOUS at 17:13

## 2017-12-05 RX ADMIN — MORPHINE SULFATE PRN MG: 2 INJECTION, SOLUTION INTRAMUSCULAR; INTRAVENOUS at 13:07

## 2017-12-05 RX ADMIN — SPIRONOLACTONE SCH MG: 25 TABLET, FILM COATED ORAL at 08:54

## 2017-12-05 RX ADMIN — MORPHINE SULFATE PRN MG: 2 INJECTION, SOLUTION INTRAMUSCULAR; INTRAVENOUS at 21:13

## 2017-12-05 RX ADMIN — PHENYTOIN SODIUM SCH MLS/HR: 50 INJECTION INTRAMUSCULAR; INTRAVENOUS at 11:43

## 2017-12-05 RX ADMIN — ZOLPIDEM TARTRATE PRN MG: 5 TABLET, COATED ORAL at 23:09

## 2017-12-05 RX ADMIN — DOCUSATE SODIUM SCH MG: 100 CAPSULE, LIQUID FILLED ORAL at 19:25

## 2017-12-05 RX ADMIN — IPRATROPIUM BROMIDE SCH MG: 0.5 SOLUTION RESPIRATORY (INHALATION) at 11:43

## 2017-12-05 RX ADMIN — IPRATROPIUM BROMIDE SCH MG: 0.5 SOLUTION RESPIRATORY (INHALATION) at 08:00

## 2017-12-05 RX ADMIN — MORPHINE SULFATE PRN MG: 2 INJECTION, SOLUTION INTRAMUSCULAR; INTRAVENOUS at 08:55

## 2017-12-05 RX ADMIN — VANCOMYCIN HYDROCHLORIDE SCH MLS/HR: 1 INJECTION, SOLUTION INTRAVENOUS at 08:54

## 2017-12-05 RX ADMIN — HYDROCODONE BITARTRATE AND ACETAMINOPHEN PRN TAB: 7.5; 325 TABLET ORAL at 10:29

## 2017-12-05 RX ADMIN — PHENYTOIN SODIUM SCH MLS/HR: 50 INJECTION INTRAMUSCULAR; INTRAVENOUS at 19:21

## 2017-12-05 RX ADMIN — HYDROCODONE BITARTRATE AND ACETAMINOPHEN PRN TAB: 7.5; 325 TABLET ORAL at 19:25

## 2017-12-05 RX ADMIN — MORPHINE SULFATE PRN MG: 2 INJECTION, SOLUTION INTRAMUSCULAR; INTRAVENOUS at 03:53

## 2017-12-05 RX ADMIN — DILTIAZEM HYDROCHLORIDE SCH MG: 120 CAPSULE, EXTENDED RELEASE ORAL at 08:53

## 2017-12-05 RX ADMIN — MULTIPLE VITAMINS W/ MINERALS TAB SCH TAB: TAB at 19:26

## 2017-12-05 RX ADMIN — ENOXAPARIN SODIUM SCH MG: 40 INJECTION SUBCUTANEOUS at 08:55

## 2017-12-05 RX ADMIN — ALBUTEROL SULFATE SCH MG: 1.25 SOLUTION RESPIRATORY (INHALATION) at 19:28

## 2017-12-05 RX ADMIN — IPRATROPIUM BROMIDE SCH MG: 0.5 SOLUTION RESPIRATORY (INHALATION) at 19:28

## 2017-12-05 RX ADMIN — PHENYTOIN SODIUM SCH MLS/HR: 50 INJECTION INTRAMUSCULAR; INTRAVENOUS at 02:52

## 2017-12-05 RX ADMIN — PANTOPRAZOLE SCH MG: 40 TABLET, DELAYED RELEASE ORAL at 19:26

## 2017-12-05 RX ADMIN — ALBUTEROL SULFATE SCH MG: 1.25 SOLUTION RESPIRATORY (INHALATION) at 15:59

## 2017-12-05 RX ADMIN — MONTELUKAST SODIUM SCH MG: 10 TABLET, COATED ORAL at 19:25

## 2017-12-05 RX ADMIN — ALBUTEROL SULFATE SCH MG: 1.25 SOLUTION RESPIRATORY (INHALATION) at 08:00

## 2017-12-05 RX ADMIN — ALBUTEROL SULFATE SCH MG: 1.25 SOLUTION RESPIRATORY (INHALATION) at 11:42

## 2017-12-05 RX ADMIN — HYDROCODONE BITARTRATE AND ACETAMINOPHEN PRN TAB: 7.5; 325 TABLET ORAL at 15:14

## 2017-12-05 NOTE — HHI.PR
Subjective


Remarks


Follow-up TIA, CAD, hypertension and hyperlipidemia.  Patient has no complaints 

he is doing well.  Confirms she takes Zithromax one week on and one week off 

not due until this Sunday for GERD.  Discussed with RN





Objective


Vitals





Vital Signs








  Date Time  Temp Pulse Resp B/P (MAP) Pulse Ox O2 Delivery O2 Flow Rate FiO2


 


12/5/17 11:30   16     


 


12/5/17 09:00   16     


 


12/5/17 08:00 98.6 87 18 116/60 (78) 99   


 


12/5/17 04:07 96.9 79 17 116/69 (85) 96   


 


12/5/17 01:24 97.8 84 18 105/58 (74) 95   


 


12/5/17 00:06 97.4 72 17 119/67 (84) 99   


 


12/4/17 20:06 97.7 96 17 118/58 (78) 100   


 


12/4/17 16:00 96.7 87 18 124/64 (84) 100   














I/O      


 


 12/4/17 12/4/17 12/4/17 12/5/17 12/5/17 12/5/17





 07:00 15:00 23:00 07:00 15:00 23:00


 


Intake Total  2000 ml 240 ml 240 ml  


 


Output Total  4200 ml    


 


Balance  -2200 ml 240 ml 240 ml  


 


      


 


Intake Oral   240 ml 240 ml  


 


IV Total  2000 ml    


 


Output Urine Total  600 ml    


 


Estimated Blood Loss  600 ml    


 


Other  3000 ml    


 


# Voids   1 1  


 


# Bowel Movements   0 0  








Result Diagram:  


12/5/17 0522                                                                   

             12/5/17 0522





Imaging





Last Impressions








Hip and Pelvis X-Ray 12/4/17 0652 Signed





Impressions: 





 Service Date/Time:  Monday, December 4, 2017 09:56 - CONCLUSION:  Expected 





 changes following left total hip arthroplasty.     Ayden Pate MD 


 


Hip X-Ray 12/4/17 0000 Signed





Impressions: 





 Service Date/Time:  Monday, December 4, 2017 07:18 - CONCLUSION: Post surgical 





 changes as above.    Tono Santana MD 








Objective Remarks


GENERAL:  female lying in bed


SKIN: No rashes, ecchymoses or lesions. Cool and dry.


HEAD: Atraumatic. Normocephalic. No temporal or scalp tenderness.


EYES: Pupils equal round and reactive. Extraocular motions intact. No scleral 

icterus. No injection or drainage. 


ENT: Nose without bleeding, purulent drainage or septal hematoma. Throat 

without erythema, tonsillar hypertrophy or exudate. Uvula midline. Airway 

patent.


NECK: Trachea midline. No JVD or lymphadenopathy. Supple, nontender, no 

meningeal signs.


CARDIOVASCULAR: Regular rate and rhythm without murmurs, gallops, or rubs. 


RESPIRATORY: Clear to auscultation. Breath sounds equal bilaterally. No wheezes

, rales, or rhonchi.  


GASTROINTESTINAL: Abdomen soft, non-tender, nondistended. No guarding.


MUSCULOSKELETAL: Extremities without clubbing, cyanosis, or edema. No joint 

tenderness, effusion, or edema noted. No calf tenderness. 


NEUROLOGICAL: Awake and alert. Cranial nerves II through XII intact.  Motor and 

sensory grossly within normal limits. Normal speech.





A/P


Assessment and Plan


57-year-old female with a past medical history of systemic lupus erythematous, 

CAD, asthma, hyperlipidemia, hypertension, atrial fibrillation/flutter, IBS, 

gastroparesis, osteoarthritis and history of TIA  is postop day 0 status post 

left total hip arthroplasty.





1.  Left hip arthroplasty on 12/4/17


Management per orthopedics continue PT, wound care, pain management with Lortab 

and IV morphine and DVT prophylaxis with Lovenox





2.  Atrial fibrillation/flutter


TIA


CAD


HTN


HLD


Above previously mentioned medical conditions stable.  


Continue home diltiazem 


Continue spironolactone


ASA when ok by ortho


Intolerant to statin





3.  Asthma


Duo nebs when necessary





Leukocytosis likely reactive.  Monitor





Anemia secondary to acute blood loss.  Asymptomatic.  Monitor





Hyperglycemia.  A1c 5.4.  Continue to monitor











Paul Hassan MD Dec 5, 2017 12:47

## 2017-12-05 NOTE — HHI.DCPOC
Discharge Care Plan


Diagnosis:  


(1) Primary localized osteoarthrosis, pelvic region and thigh








Your Health Problems Are: Difficulty with ADL








Goals to Promote Your Health


* To prevent worsening of your condition and complications


* To maintain your health at the optimal level


Directions to Meet Your Goals


*** Take your medications as prescribed


*** Follow your dietary instruction


*** Follow activity as directed








*** Keep your appointments as scheduled


*** Take your immunizations and boosters as scheduled


*** If your symptoms worsen call your PCP, if no PCP go to Urgent Care Center 

or Emergency Room***


*** Smoking is Dangerous to Your Health. Avoid second hand smoke***


***Call the 24-hour hour crisis hotline for domestic abuse at 1-119.354.7136***











Yovany Churchill Dec 5, 2017 09:01

## 2017-12-05 NOTE — PD.ORT.PN
Subjective


Post Op Day #:  1


Subjective Remarks


pain sever last night, better this am.





Objective


Vitals





Vital Signs








  Date Time  Temp Pulse Resp B/P (MAP) Pulse Ox O2 Delivery O2 Flow Rate FiO2


 


12/5/17 08:00 98.6 87 18 116/60 (78) 99   


 


12/5/17 07:00   16     


 


12/5/17 04:07 96.9 79 17 116/69 (85) 96   


 


12/5/17 01:24 97.8 84 18 105/58 (74) 95   


 


12/5/17 00:06 97.4 72 17 119/67 (84) 99   


 


12/4/17 20:06 97.7 96 17 118/58 (78) 100   


 


12/4/17 16:00 96.7 87 18 124/64 (84) 100   


 


12/4/17 11:20 96.0 82 18 109/58 (75) 100   


 


12/4/17 10:40 98.1 80 20 115/56 (75) 100 Nasal Cannula 2 


 


12/4/17 10:30  80 20 115/56 (75) 100 Nasal Cannula 2 


 


12/4/17 10:15  65 20 119/56 (77) 100 Nasal Cannula 2 


 


12/4/17 10:00  72 20 103/54 (70) 100 Nasal Cannula 2 


 


12/4/17 09:38 98.1 82 20 98/56 (70) 94 Nasal Cannula 2 














I/O      


 


 12/4/17 12/4/17 12/4/17 12/5/17 12/5/17 12/5/17





 07:00 15:00 23:00 07:00 15:00 23:00


 


Intake Total  2000 ml 240 ml 240 ml  


 


Output Total  4200 ml    


 


Balance  -2200 ml 240 ml 240 ml  


 


      


 


Intake Oral   240 ml 240 ml  


 


IV Total  2000 ml    


 


Output Urine Total  600 ml    


 


Estimated Blood Loss  600 ml    


 


Other  3000 ml    


 


# Voids   1 1  


 


# Bowel Movements   0 0  








Result Diagram:  


12/5/17 0522 12/5/17 0522





Objective Remarks


in bed, nad


dressing c/d/i


neg homans


nvi





Assessment & Plan


Ortho Post Op Day #:  1


Problem List:  


Assessment and Plan


s/p L YOLIE anterior approach


wbat


ok to maintain dressing unless saturated


lovenox


d/c planning home with [facility] and pt - probably Wed


rx in chart


f/up dr. jama 2 weeks











Yovany Churchill Dec 5, 2017 08:59

## 2017-12-06 VITALS
OXYGEN SATURATION: 96 % | SYSTOLIC BLOOD PRESSURE: 121 MMHG | DIASTOLIC BLOOD PRESSURE: 65 MMHG | RESPIRATION RATE: 16 BRPM | HEART RATE: 92 BPM | TEMPERATURE: 99 F

## 2017-12-06 VITALS
RESPIRATION RATE: 18 BRPM | SYSTOLIC BLOOD PRESSURE: 153 MMHG | TEMPERATURE: 98.1 F | DIASTOLIC BLOOD PRESSURE: 64 MMHG | HEART RATE: 93 BPM | OXYGEN SATURATION: 100 %

## 2017-12-06 VITALS
SYSTOLIC BLOOD PRESSURE: 114 MMHG | RESPIRATION RATE: 20 BRPM | TEMPERATURE: 97.9 F | HEART RATE: 89 BPM | OXYGEN SATURATION: 95 % | DIASTOLIC BLOOD PRESSURE: 64 MMHG

## 2017-12-06 VITALS
HEART RATE: 92 BPM | OXYGEN SATURATION: 99 % | TEMPERATURE: 98 F | DIASTOLIC BLOOD PRESSURE: 69 MMHG | RESPIRATION RATE: 18 BRPM | SYSTOLIC BLOOD PRESSURE: 121 MMHG

## 2017-12-06 VITALS
RESPIRATION RATE: 18 BRPM | TEMPERATURE: 98.2 F | OXYGEN SATURATION: 98 % | SYSTOLIC BLOOD PRESSURE: 128 MMHG | DIASTOLIC BLOOD PRESSURE: 69 MMHG | HEART RATE: 89 BPM

## 2017-12-06 VITALS
SYSTOLIC BLOOD PRESSURE: 114 MMHG | HEART RATE: 87 BPM | OXYGEN SATURATION: 95 % | DIASTOLIC BLOOD PRESSURE: 64 MMHG | RESPIRATION RATE: 19 BRPM | TEMPERATURE: 98.1 F

## 2017-12-06 VITALS
RESPIRATION RATE: 18 BRPM | SYSTOLIC BLOOD PRESSURE: 106 MMHG | TEMPERATURE: 97.9 F | OXYGEN SATURATION: 97 % | DIASTOLIC BLOOD PRESSURE: 56 MMHG | HEART RATE: 82 BPM

## 2017-12-06 VITALS — OXYGEN SATURATION: 95 %

## 2017-12-06 VITALS — OXYGEN SATURATION: 99 %

## 2017-12-06 LAB
ERYTHROCYTE [DISTWIDTH] IN BLOOD BY AUTOMATED COUNT: 15.7 % (ref 11.6–17.2)
HCT VFR BLD CALC: 21.4 % (ref 35–46)
MCH RBC QN AUTO: 31 PG (ref 27–34)
MCHC RBC AUTO-ENTMCNC: 33.1 % (ref 32–36)
MCV RBC AUTO: 93.8 FL (ref 80–100)
PLATELET # BLD: 165 TH/MM3 (ref 150–450)
RBC # BLD AUTO: 2.28 MIL/MM3 (ref 4–5.3)
REVIEW FLAG: (no result)
WBC # BLD AUTO: 12.7 TH/MM3 (ref 4–11)

## 2017-12-06 PROCEDURE — 30233N1 TRANSFUSION OF NONAUTOLOGOUS RED BLOOD CELLS INTO PERIPHERAL VEIN, PERCUTANEOUS APPROACH: ICD-10-PCS | Performed by: ORTHOPAEDIC SURGERY

## 2017-12-06 RX ADMIN — GABAPENTIN SCH MG: 300 CAPSULE ORAL at 10:01

## 2017-12-06 RX ADMIN — DILTIAZEM HYDROCHLORIDE SCH MG: 120 CAPSULE, EXTENDED RELEASE ORAL at 10:01

## 2017-12-06 RX ADMIN — HYDROCODONE BITARTRATE AND ACETAMINOPHEN PRN TAB: 7.5; 325 TABLET ORAL at 01:33

## 2017-12-06 RX ADMIN — HYDROCODONE BITARTRATE AND ACETAMINOPHEN PRN TAB: 7.5; 325 TABLET ORAL at 14:18

## 2017-12-06 RX ADMIN — DOCUSATE SODIUM SCH MG: 100 CAPSULE, LIQUID FILLED ORAL at 10:01

## 2017-12-06 RX ADMIN — CYCLOBENZAPRINE HYDROCHLORIDE PRN MG: 10 TABLET, FILM COATED ORAL at 21:47

## 2017-12-06 RX ADMIN — ENOXAPARIN SODIUM SCH MG: 40 INJECTION SUBCUTANEOUS at 10:01

## 2017-12-06 RX ADMIN — IPRATROPIUM BROMIDE SCH MG: 0.5 SOLUTION RESPIRATORY (INHALATION) at 16:00

## 2017-12-06 RX ADMIN — HYDROCODONE BITARTRATE AND ACETAMINOPHEN PRN TAB: 7.5; 325 TABLET ORAL at 10:00

## 2017-12-06 RX ADMIN — HYDROCODONE BITARTRATE AND ACETAMINOPHEN PRN TAB: 7.5; 325 TABLET ORAL at 05:31

## 2017-12-06 RX ADMIN — ONDANSETRON PRN MG: 2 INJECTION, SOLUTION INTRAMUSCULAR; INTRAVENOUS at 18:50

## 2017-12-06 RX ADMIN — MULTIPLE VITAMINS W/ MINERALS TAB SCH TAB: TAB at 21:47

## 2017-12-06 RX ADMIN — DOCUSATE SODIUM SCH MG: 100 CAPSULE, LIQUID FILLED ORAL at 21:47

## 2017-12-06 RX ADMIN — ALBUTEROL SULFATE SCH MG: 1.25 SOLUTION RESPIRATORY (INHALATION) at 08:00

## 2017-12-06 RX ADMIN — PANTOPRAZOLE SCH MG: 40 TABLET, DELAYED RELEASE ORAL at 21:47

## 2017-12-06 RX ADMIN — CYCLOBENZAPRINE HYDROCHLORIDE PRN MG: 10 TABLET, FILM COATED ORAL at 10:58

## 2017-12-06 RX ADMIN — GABAPENTIN SCH MG: 300 CAPSULE ORAL at 21:47

## 2017-12-06 RX ADMIN — PHENYTOIN SODIUM SCH MLS/HR: 50 INJECTION INTRAMUSCULAR; INTRAVENOUS at 18:52

## 2017-12-06 RX ADMIN — MONTELUKAST SODIUM SCH MG: 10 TABLET, COATED ORAL at 21:47

## 2017-12-06 RX ADMIN — SPIRONOLACTONE SCH MG: 25 TABLET, FILM COATED ORAL at 10:02

## 2017-12-06 RX ADMIN — IPRATROPIUM BROMIDE SCH MG: 0.5 SOLUTION RESPIRATORY (INHALATION) at 19:45

## 2017-12-06 RX ADMIN — PHENYTOIN SODIUM SCH MLS/HR: 50 INJECTION INTRAMUSCULAR; INTRAVENOUS at 08:52

## 2017-12-06 RX ADMIN — FOLIC ACID SCH MG: 1 TABLET ORAL at 21:47

## 2017-12-06 RX ADMIN — ALBUTEROL SULFATE SCH MG: 1.25 SOLUTION RESPIRATORY (INHALATION) at 11:31

## 2017-12-06 RX ADMIN — MORPHINE SULFATE PRN MG: 2 INJECTION, SOLUTION INTRAMUSCULAR; INTRAVENOUS at 21:55

## 2017-12-06 RX ADMIN — HYDROCODONE BITARTRATE AND ACETAMINOPHEN PRN TAB: 7.5; 325 TABLET ORAL at 18:50

## 2017-12-06 RX ADMIN — MULTIPLE VITAMINS W/ MINERALS TAB SCH TAB: TAB at 10:01

## 2017-12-06 RX ADMIN — IPRATROPIUM BROMIDE SCH MG: 0.5 SOLUTION RESPIRATORY (INHALATION) at 08:00

## 2017-12-06 NOTE — HHI.FF
Face to Face Verification


Diagnosis:  


(1) Primary localized osteoarthrosis, pelvic region and thigh


Physical Therapy


Gait training, Safety evaluation, Transfer training, bed to chair


Hip:  Total hip, Protocol: Left


Left LE Weight Bearing:  WB as tolerated





Nursing


RN:  3 days/week x 2 weeks


Nursing:  Lovenox teaching, Dressing changes


Dressing Changes:  Daily dressing change











I have seen patient Nova Haskins on 12/6/17. My clinical findings support the 

need for the requested home health care services because:








 Limited ability to care for self





 High risk of falls














I certify that my clinical findings support that this patient is homebound 

because:








 Post-op weakness





 Unsteady gait/balance

















Yovany Churchill Dec 6, 2017 15:31

## 2017-12-06 NOTE — PD.ORT.PN
Subjective


Post Op Day #:  2


Subjective Remarks


pain improving.  lethargic.





Objective


Vitals





Vital Signs








  Date Time  Temp Pulse Resp B/P (MAP) Pulse Ox O2 Delivery O2 Flow Rate FiO2


 


12/6/17 00:00 98.0 92 18 121/69 (86) 99   


 


12/5/17 20:00 97.9 99 18 118/58 (78) 100   


 


12/5/17 16:19   16     


 


12/5/17 16:00 98.8 86 18 117/56 (76) 97   


 


12/5/17 13:15   16     


 


12/5/17 12:00 97.3 98 18 136/77 (96) 99   














I/O      


 


 12/5/17 12/5/17 12/5/17 12/6/17 12/6/17 12/6/17





 07:00 15:00 23:00 07:00 15:00 23:00


 


Intake Total 240 ml 960 ml    


 


Balance 240 ml 960 ml    


 


      


 


Intake Oral 240 ml 960 ml    


 


# Voids 1 3    


 


# Bowel Movements 0 0    








Result Diagram:  


12/6/17 0622                                                                   

             12/5/17 0522





Objective Remarks


in bed, nad


dressing c/d/i


neg homans


nvi





Assessment & Plan


Ortho Post Op Day #:  2


Problem List:  


Assessment and Plan


s/p L YOLIE anterior approach


wbat


ok to maintain dressing unless saturated


lovenox


anemia - transfuse 2 units


d/c planning home with [facility] and pt - today or tomorrow depending upon transfusion


rx in chart


f/up dr. jama 2 weeks











Yovany Churchill Dec 6, 2017 08:26

## 2017-12-06 NOTE — HHI.PR
Subjective


Remarks


Follow-up anemia and leukocytosis.  She feels weak.  Expected hip pain.  

Discussed with RN





Objective


Vitals





Vital Signs








  Date Time  Temp Pulse Resp B/P (MAP) Pulse Ox O2 Delivery O2 Flow Rate FiO2


 


12/6/17 08:35     99   21


 


12/6/17 08:00 97.9 89 20 114/64 (81) 95   


 


12/6/17 00:00 98.0 92 18 121/69 (86) 99   


 


12/5/17 20:00 97.9 99 18 118/58 (78) 100   


 


12/5/17 16:19   16     


 


12/5/17 16:00 98.8 86 18 117/56 (76) 97   


 


12/5/17 13:15   16     


 


12/5/17 12:00 97.3 98 18 136/77 (96) 99   














I/O      


 


 12/5/17 12/5/17 12/5/17 12/6/17 12/6/17 12/6/17





 07:00 15:00 23:00 07:00 15:00 23:00


 


Intake Total 240 ml 960 ml    


 


Balance 240 ml 960 ml    


 


      


 


Intake Oral 240 ml 960 ml    


 


# Voids 1 3    


 


# Bowel Movements 0 0    








Result Diagram:  


12/6/17 0622                                                                   

             12/5/17 0522





Imaging





Last Impressions








Hip and Pelvis X-Ray 12/4/17 0652 Signed





Impressions: 





 Service Date/Time:  Monday, December 4, 2017 09:56 - CONCLUSION:  Expected 





 changes following left total hip arthroplasty.     Ayden Pate MD 


 


Hip X-Ray 12/4/17 0000 Signed





Impressions: 





 Service Date/Time:  Monday, December 4, 2017 07:18 - CONCLUSION: Post surgical 





 changes as above.    Tono Santana MD 








Objective Remarks


GENERAL:  female lying in bed


SKIN: No rashes, ecchymoses or lesions. Cool and dry.


CARDIOVASCULAR: Regular rate and rhythm without murmurs, gallops, or rubs. 


RESPIRATORY: Clear to auscultation. Breath sounds equal bilaterally. No wheezes

, rales, or rhonchi.  


GASTROINTESTINAL: Abdomen soft, non-tender, nondistended. No guarding.


MUSCULOSKELETAL: Extremities without clubbing, cyanosis, or edema. No joint 

tenderness, effusion, or edema noted. No calf tenderness. 


NEUROLOGICAL: Awake and alert. Cranial nerves II through XII intact.  Motor and 

sensory grossly within normal limits. Normal speech.





A/P


Assessment and Plan


57-year-old female with a past medical history of systemic lupus erythematous, 

CAD, asthma, hyperlipidemia, hypertension, atrial fibrillation/flutter, IBS, 

gastroparesis, osteoarthritis and history of TIA  is postop day 0 status post 

left total hip arthroplasty.





1.  Left hip arthroplasty on 12/4/17


Management per orthopedics continue PT, wound care, pain management with Lortab 

and IV morphine and DVT prophylaxis with Lovenox





2.  Atrial fibrillation/flutter


TIA


CAD


HTN


HLD


Above mentioned medical conditions stable.  


Continue home diltiazem 


Continue spironolactone


ASA when ok by ortho


Intolerant to statin





3.  Asthma


Duo nebs when necessary





Leukocytosis likely reactive.  Improving.  Monitor





Anemia secondary to acute blood loss.  Worse patient to receive 1 unit packed 

RBCs before discharge.  Monitor





Hyperglycemia.  A1c 5.4.  Continue to monitor











Paul Hassan MD Dec 6, 2017 10:44

## 2017-12-07 VITALS
OXYGEN SATURATION: 97 % | RESPIRATION RATE: 20 BRPM | TEMPERATURE: 99.1 F | DIASTOLIC BLOOD PRESSURE: 66 MMHG | HEART RATE: 97 BPM | SYSTOLIC BLOOD PRESSURE: 120 MMHG

## 2017-12-07 VITALS
OXYGEN SATURATION: 99 % | SYSTOLIC BLOOD PRESSURE: 120 MMHG | RESPIRATION RATE: 20 BRPM | DIASTOLIC BLOOD PRESSURE: 65 MMHG | TEMPERATURE: 98.2 F | HEART RATE: 84 BPM

## 2017-12-07 VITALS
SYSTOLIC BLOOD PRESSURE: 135 MMHG | TEMPERATURE: 99 F | HEART RATE: 93 BPM | OXYGEN SATURATION: 100 % | DIASTOLIC BLOOD PRESSURE: 62 MMHG

## 2017-12-07 VITALS
HEART RATE: 89 BPM | DIASTOLIC BLOOD PRESSURE: 74 MMHG | RESPIRATION RATE: 19 BRPM | OXYGEN SATURATION: 99 % | SYSTOLIC BLOOD PRESSURE: 142 MMHG | TEMPERATURE: 99.7 F

## 2017-12-07 VITALS — TEMPERATURE: 99.4 F | SYSTOLIC BLOOD PRESSURE: 128 MMHG | DIASTOLIC BLOOD PRESSURE: 63 MMHG

## 2017-12-07 VITALS
OXYGEN SATURATION: 95 % | SYSTOLIC BLOOD PRESSURE: 116 MMHG | RESPIRATION RATE: 20 BRPM | DIASTOLIC BLOOD PRESSURE: 66 MMHG | HEART RATE: 83 BPM | TEMPERATURE: 98.2 F

## 2017-12-07 VITALS — TEMPERATURE: 98.3 F

## 2017-12-07 VITALS — RESPIRATION RATE: 18 BRPM

## 2017-12-07 LAB
ERYTHROCYTE [DISTWIDTH] IN BLOOD BY AUTOMATED COUNT: 16.8 % (ref 11.6–17.2)
HCT VFR BLD CALC: 25.6 % (ref 35–46)
MCH RBC QN AUTO: 31.9 PG (ref 27–34)
MCHC RBC AUTO-ENTMCNC: 34.5 % (ref 32–36)
MCV RBC AUTO: 92.3 FL (ref 80–100)
PLATELET # BLD: 178 TH/MM3 (ref 150–450)
RBC # BLD AUTO: 2.77 MIL/MM3 (ref 4–5.3)
REVIEW FLAG: (no result)
WBC # BLD AUTO: 11.2 TH/MM3 (ref 4–11)

## 2017-12-07 RX ADMIN — HYDROCODONE BITARTRATE AND ACETAMINOPHEN PRN TAB: 7.5; 325 TABLET ORAL at 08:33

## 2017-12-07 RX ADMIN — CYCLOBENZAPRINE HYDROCHLORIDE PRN MG: 10 TABLET, FILM COATED ORAL at 11:42

## 2017-12-07 RX ADMIN — SPIRONOLACTONE SCH MG: 25 TABLET, FILM COATED ORAL at 08:33

## 2017-12-07 RX ADMIN — HYDROCODONE BITARTRATE AND ACETAMINOPHEN PRN TAB: 7.5; 325 TABLET ORAL at 12:19

## 2017-12-07 RX ADMIN — GABAPENTIN SCH MG: 300 CAPSULE ORAL at 08:32

## 2017-12-07 RX ADMIN — DOCUSATE SODIUM SCH MG: 100 CAPSULE, LIQUID FILLED ORAL at 08:32

## 2017-12-07 RX ADMIN — DILTIAZEM HYDROCHLORIDE SCH MG: 120 CAPSULE, EXTENDED RELEASE ORAL at 08:32

## 2017-12-07 RX ADMIN — MULTIPLE VITAMINS W/ MINERALS TAB SCH TAB: TAB at 08:32

## 2017-12-07 RX ADMIN — ZOLPIDEM TARTRATE PRN MG: 5 TABLET, COATED ORAL at 01:30

## 2017-12-07 RX ADMIN — ENOXAPARIN SODIUM SCH MG: 40 INJECTION SUBCUTANEOUS at 08:32

## 2017-12-07 RX ADMIN — PHENYTOIN SODIUM SCH MLS/HR: 50 INJECTION INTRAMUSCULAR; INTRAVENOUS at 04:52

## 2017-12-07 RX ADMIN — ALBUTEROL SULFATE SCH MG: 1.25 SOLUTION RESPIRATORY (INHALATION) at 08:00

## 2017-12-07 RX ADMIN — HYDROCODONE BITARTRATE AND ACETAMINOPHEN PRN TAB: 7.5; 325 TABLET ORAL at 00:14

## 2017-12-07 RX ADMIN — IPRATROPIUM BROMIDE SCH MG: 0.5 SOLUTION RESPIRATORY (INHALATION) at 08:00

## 2017-12-07 NOTE — PD.ORT.PN
Subjective


Post Op Day #:  3


Subjective Remarks


pain improving.  lethargic. received 2 units PRBC yesterday and last night.





Objective


Vitals





Vital Signs








  Date Time  Temp Pulse Resp B/P (MAP) Pulse Ox O2 Delivery O2 Flow Rate FiO2


 


12/7/17 04:00 98.2 83 20 116/66 (83) 95   


 


12/7/17 01:14   18     


 


12/7/17 00:26 99.0 93  135/62 100   


 


12/7/17 00:24 98.3       


 


12/7/17 00:22 99.4   128/63    


 


12/7/17 00:00 99.1 97 20 120/66 (84) 97   


 


12/6/17 20:05 99.0 92 16 121/65 (83) 96   


 


12/6/17 19:45     95   


 


12/6/17 16:00 98.1 87 19 114/64 (81) 95   


 


12/6/17 14:39 98.2 89 18 128/69 98   


 


12/6/17 11:20 97.9 82 18 106/56 97   


 


12/6/17 11:05 98.1 93 18 153/64 100   


 


12/6/17 08:35     99   21


 


12/6/17 08:00 97.9 89 20 114/64 (81) 95   














I/O      


 


 12/6/17 12/6/17 12/6/17 12/7/17 12/7/17 12/7/17





 07:00 15:00 23:00 07:00 15:00 23:00


 


Intake Total  904 ml 360 ml 10 ml  


 


Balance  904 ml 360 ml 10 ml  


 


      


 


Intake Oral  500 ml 360 ml   


 


Packed Cells  400 ml    


 


Blood Product IV Normal Saline Flush  4 ml  10 ml  


 


# Voids  3 1   


 


# Bowel Movements  0 0   








Result Diagram:  


12/6/17 0622                                                                   

             12/5/17 0522





Objective Remarks


in bed, nad


dressing c/d/i


thigh soft


neg homans


nvi





Assessment & Plan


Ortho Post Op Day #:  3


Problem List:  


Assessment and Plan


s/p L YOLIE anterior approach


wbat


ok to maintain dressing unless saturated


lovenox


anemia - transfused 2 units


d/c planning home with [facility] and pt - cleared today


rx in chart


f/up dr. jama 2 weeks











Yovany Churchill Dec 7, 2017 07:59

## 2017-12-07 NOTE — HHI.PR
Subjective


Remarks


Follow-up anemia.  She is feeling better and wants to go home.  Seen with 

.  Discussed with RN





Objective


Vitals





Vital Signs








  Date Time  Temp Pulse Resp B/P (MAP) Pulse Ox O2 Delivery O2 Flow Rate FiO2


 


12/7/17 08:00 99.7 89 19 142/74 (96) 99   


 


12/7/17 04:00 98.2 83 20 116/66 (83) 95   


 


12/7/17 02:50 98.2 84 20 120/65 99   


 


12/7/17 01:14   18     


 


12/7/17 00:26 99.0 93  135/62 100   


 


12/7/17 00:24 98.3       


 


12/7/17 00:22 99.4   128/63    


 


12/7/17 00:00 99.1 97 20 120/66 (84) 97   


 


12/6/17 20:05 99.0 92 16 121/65 (83) 96   


 


12/6/17 19:45     95   


 


12/6/17 16:00 98.1 87 19 114/64 (81) 95   


 


12/6/17 14:39 98.2 89 18 128/69 98   














I/O      


 


 12/6/17 12/6/17 12/6/17 12/7/17 12/7/17 12/7/17





 07:00 15:00 23:00 07:00 15:00 23:00


 


Intake Total  904 ml 360 ml 710 ml  


 


Balance  904 ml 360 ml 710 ml  


 


      


 


Intake Oral  500 ml 360 ml   


 


Packed Cells  400 ml  400 ml  


 


Blood Product IV Normal Saline Flush  4 ml  310 ml  


 


# Voids  3 1   


 


# Bowel Movements  0 0   








Result Diagram:  


12/7/17 0753                                                                   

             12/5/17 0522





Imaging





Last Impressions








Hip and Pelvis X-Ray 12/4/17 0652 Signed





Impressions: 





 Service Date/Time:  Monday, December 4, 2017 09:56 - CONCLUSION:  Expected 





 changes following left total hip arthroplasty.     Ayden Pate MD 


 


Hip X-Ray 12/4/17 0000 Signed





Impressions: 





 Service Date/Time:  Monday, December 4, 2017 07:18 - CONCLUSION: Post surgical 





 changes as above.    Tono Santana MD 








Objective Remarks


GENERAL:  female lying in bed


SKIN: No rashes, ecchymoses or lesions. Cool and dry.


CARDIOVASCULAR: Regular rate and rhythm without murmurs, gallops, or rubs. 


RESPIRATORY: Clear to auscultation. Breath sounds equal bilaterally. No wheezes

, rales, or rhonchi.  


GASTROINTESTINAL: Abdomen soft, non-tender, nondistended. No guarding.


MUSCULOSKELETAL: Extremities without clubbing, cyanosis, or edema. No joint 

tenderness, effusion, or edema noted. No calf tenderness. 


NEUROLOGICAL: Awake and alert. Cranial nerves II through XII intact.  Motor and 

sensory grossly within normal limits. Normal speech.





A/P


Assessment and Plan


57-year-old female with a past medical history of systemic lupus erythematous, 

CAD, asthma, hyperlipidemia, hypertension, atrial fibrillation/flutter, IBS, 

gastroparesis, osteoarthritis and history of TIA  is postop day 0 status post 

left total hip arthroplasty.





1.  Left hip arthroplasty on 12/4/17


Management per orthopedics continue PT, wound care, pain management with Lortab 

and IV morphine and DVT prophylaxis with Lovenox





2.  Atrial fibrillation/flutter, TIA, CAD, and HTN.  Stable. Continue home 

diltiazem, spironolactone and ASA when ok by ortho. Intolerant to statin





3.  Asthma. Stable. Duo nebs when necessary





Leukocytosis likely reactive.  Improving.  Monitor





Anemia secondary to acute blood loss.  Improved after 1 unit packed RBCs 

transfusion.  Monitor





Hyperglycemia.  A1c 5.4.  Continue to monitor


Discharge Planning


Stable for discharge











Paul Hassan MD Dec 7, 2017 11:26

## 2017-12-08 NOTE — MD
cc:

MIQUEL MATHEWS M.D.

****

 

 

ADMISSION DATE: 12/4/2017    DISCHARGE DATE:  12/7/2017

 

ADMISSION DIAGNOSES

Severe degenerative osteoarthritis left hip.

 

DISCHARGE DIAGNOSIS

Severe degenerative osteoarthritis left hip.

 

BRIEF HISTORY

Mrs. Haskins is a 57-year-old female who has been a patient of Dr. Miquel Mathews at the [facility] regarding bilateral hip

severe osteoarthritis.  She does have history of well-positioned and

functioning right total hip arthroplasty.  Currently she has been treated for

severe and progressive left hip pain.  The patient states the pain is

inhibiting her activities of daily living.  She has failed conservative

treatment options including medications, assistive devices and physical

therapy.  She has no alleviating factors.

 

The patient also has a past medical history significant for lupus, coronary

artery disease, asthma, hyperlipidemia, hypertension and atrial fibrillation.

The patient does have x-ray evidence of severe degenerative osteoarthritis of

the left hip.  While the patient was in the office she was counseled on her

diagnosis and treatment options. The risks, benefits and indications all were

discussed.  The patient did elect to proceed with surgical intervention to

include a left total hip arthroplasty.

 

DATE OF SURGERY

12/04/2017

 

PROCEDURE PERFORMED

Left total hip arthroplasty anterior approach.

 

POSTOP

After surgery the patient was admitted to [facility] where she

received appropriate medical management, pain control, DVT prophylaxis as well

as physical therapy.  The patient did have postoperative anemia and received 2

units of packed red blood cells during her hospital admission.

 

DISCHARGE

Once being discharged from the hospital the patient is cleared to go home where

she will receive home health care and home physical therapy.  She is in stable

condition.

 

DISCHARGE INSTRUCTIONS

She may weight-bear as tolerated.  The patient is to receive daily dressing

changes if dressing becomes saturated.

 

DISCHARGE MEDICATIONS

She has been provided prescriptions for pain control as well as DVT prophylaxis

medication.

 

FOLLOWUP

She also been provided a follow-up appointment in approximately 2 weeks from

the date of surgery.

 

The patient has asked appropriate questions which have been answered.  The

patient is cleared for discharge.

 

 

Dictated by: Deandre Churchill PA-C

 

 

                              _________________________________

                              Miquel Mathews MD

 

 

 

JWM/SSB

D:  12/7/2017/7:59 AM

T:  12/8/2017/8:01 AM

Visit #:  [id number]

Job #:  [id number]

## 2018-04-13 ENCOUNTER — HOSPITAL ENCOUNTER (OUTPATIENT)
Dept: HOSPITAL 17 - PHED | Age: 58
Setting detail: OBSERVATION
LOS: 1 days | Discharge: HOME | End: 2018-04-14
Attending: HOSPITALIST | Admitting: HOSPITALIST
Payer: COMMERCIAL

## 2018-04-13 VITALS
RESPIRATION RATE: 20 BRPM | TEMPERATURE: 97.9 F | SYSTOLIC BLOOD PRESSURE: 169 MMHG | DIASTOLIC BLOOD PRESSURE: 75 MMHG | HEART RATE: 106 BPM | OXYGEN SATURATION: 100 %

## 2018-04-13 VITALS
OXYGEN SATURATION: 99 % | DIASTOLIC BLOOD PRESSURE: 94 MMHG | SYSTOLIC BLOOD PRESSURE: 155 MMHG | RESPIRATION RATE: 20 BRPM | HEART RATE: 82 BPM

## 2018-04-13 VITALS
RESPIRATION RATE: 20 BRPM | SYSTOLIC BLOOD PRESSURE: 187 MMHG | HEART RATE: 90 BPM | OXYGEN SATURATION: 96 % | DIASTOLIC BLOOD PRESSURE: 87 MMHG

## 2018-04-13 VITALS
RESPIRATION RATE: 20 BRPM | DIASTOLIC BLOOD PRESSURE: 140 MMHG | SYSTOLIC BLOOD PRESSURE: 184 MMHG | OXYGEN SATURATION: 97 % | HEART RATE: 84 BPM

## 2018-04-13 VITALS
TEMPERATURE: 97.5 F | HEART RATE: 77 BPM | OXYGEN SATURATION: 100 % | SYSTOLIC BLOOD PRESSURE: 190 MMHG | RESPIRATION RATE: 20 BRPM | DIASTOLIC BLOOD PRESSURE: 102 MMHG

## 2018-04-13 VITALS
OXYGEN SATURATION: 100 % | HEART RATE: 89 BPM | DIASTOLIC BLOOD PRESSURE: 85 MMHG | RESPIRATION RATE: 22 BRPM | SYSTOLIC BLOOD PRESSURE: 185 MMHG

## 2018-04-13 VITALS — OXYGEN SATURATION: 98 %

## 2018-04-13 VITALS — DIASTOLIC BLOOD PRESSURE: 73 MMHG | RESPIRATION RATE: 18 BRPM | SYSTOLIC BLOOD PRESSURE: 133 MMHG | HEART RATE: 88 BPM

## 2018-04-13 VITALS
SYSTOLIC BLOOD PRESSURE: 187 MMHG | OXYGEN SATURATION: 100 % | DIASTOLIC BLOOD PRESSURE: 117 MMHG | RESPIRATION RATE: 20 BRPM | HEART RATE: 86 BPM

## 2018-04-13 VITALS — HEIGHT: 66 IN | BODY MASS INDEX: 32.49 KG/M2 | WEIGHT: 202.16 LBS

## 2018-04-13 VITALS — HEART RATE: 92 BPM | RESPIRATION RATE: 15 BRPM | DIASTOLIC BLOOD PRESSURE: 71 MMHG | SYSTOLIC BLOOD PRESSURE: 148 MMHG

## 2018-04-13 VITALS — SYSTOLIC BLOOD PRESSURE: 164 MMHG | RESPIRATION RATE: 21 BRPM | DIASTOLIC BLOOD PRESSURE: 89 MMHG | HEART RATE: 104 BPM

## 2018-04-13 VITALS — HEART RATE: 110 BPM

## 2018-04-13 DIAGNOSIS — I10: ICD-10-CM

## 2018-04-13 DIAGNOSIS — K31.84: ICD-10-CM

## 2018-04-13 DIAGNOSIS — K21.9: ICD-10-CM

## 2018-04-13 DIAGNOSIS — I25.10: ICD-10-CM

## 2018-04-13 DIAGNOSIS — Z96.643: ICD-10-CM

## 2018-04-13 DIAGNOSIS — Z23: ICD-10-CM

## 2018-04-13 DIAGNOSIS — J10.1: Primary | ICD-10-CM

## 2018-04-13 DIAGNOSIS — J45.901: ICD-10-CM

## 2018-04-13 DIAGNOSIS — E78.5: ICD-10-CM

## 2018-04-13 DIAGNOSIS — Z90.710: ICD-10-CM

## 2018-04-13 DIAGNOSIS — Z95.1: ICD-10-CM

## 2018-04-13 DIAGNOSIS — J21.9: ICD-10-CM

## 2018-04-13 DIAGNOSIS — Z86.73: ICD-10-CM

## 2018-04-13 DIAGNOSIS — I73.00: ICD-10-CM

## 2018-04-13 DIAGNOSIS — G25.81: ICD-10-CM

## 2018-04-13 LAB
ALBUMIN SERPL-MCNC: 4.4 GM/DL (ref 3.4–5)
ALP SERPL-CCNC: 106 U/L (ref 45–117)
ALT SERPL-CCNC: 23 U/L (ref 10–53)
AST SERPL-CCNC: 18 U/L (ref 15–37)
BASOPHILS # BLD AUTO: 0 TH/MM3 (ref 0–0.2)
BASOPHILS NFR BLD: 0.6 % (ref 0–2)
BILIRUB SERPL-MCNC: 0.3 MG/DL (ref 0.2–1)
BUN SERPL-MCNC: 8 MG/DL (ref 7–18)
CALCIUM SERPL-MCNC: 9.3 MG/DL (ref 8.5–10.1)
CHLORIDE SERPL-SCNC: 105 MEQ/L (ref 98–107)
CREAT SERPL-MCNC: 0.8 MG/DL (ref 0.5–1)
EOSINOPHIL # BLD: 0.1 TH/MM3 (ref 0–0.4)
EOSINOPHIL NFR BLD: 1.2 % (ref 0–4)
ERYTHROCYTE [DISTWIDTH] IN BLOOD BY AUTOMATED COUNT: 14.9 % (ref 11.6–17.2)
GFR SERPLBLD BASED ON 1.73 SQ M-ARVRAT: 74 ML/MIN (ref 89–?)
GLUCOSE SERPL-MCNC: 95 MG/DL (ref 74–106)
HCO3 BLD-SCNC: 25.9 MEQ/L (ref 21–32)
HCT VFR BLD CALC: 41.2 % (ref 35–46)
HGB BLD-MCNC: 13.5 GM/DL (ref 11.6–15.3)
INR PPP: 1.1 RATIO
LYMPHOCYTES # BLD AUTO: 2 TH/MM3 (ref 1–4.8)
LYMPHOCYTES NFR BLD AUTO: 25 % (ref 9–44)
MCH RBC QN AUTO: 29.3 PG (ref 27–34)
MCHC RBC AUTO-ENTMCNC: 32.7 % (ref 32–36)
MCV RBC AUTO: 89.6 FL (ref 80–100)
MONOCYTE #: 0.5 TH/MM3 (ref 0–0.9)
MONOCYTES NFR BLD: 6.1 % (ref 0–8)
NEUTROPHILS # BLD AUTO: 5.3 TH/MM3 (ref 1.8–7.7)
NEUTROPHILS NFR BLD AUTO: 67.1 % (ref 16–70)
PLATELET # BLD: 321 TH/MM3 (ref 150–450)
PMV BLD AUTO: 8.6 FL (ref 7–11)
PROT SERPL-MCNC: 8.1 GM/DL (ref 6.4–8.2)
PROTHROMBIN TIME: 10.8 SEC (ref 9.8–11.6)
RBC # BLD AUTO: 4.6 MIL/MM3 (ref 4–5.3)
SODIUM SERPL-SCNC: 139 MEQ/L (ref 136–145)
TROPONIN I SERPL-MCNC: (no result) NG/ML (ref 0.02–0.05)
WBC # BLD AUTO: 7.9 TH/MM3 (ref 4–11)

## 2018-04-13 PROCEDURE — 94640 AIRWAY INHALATION TREATMENT: CPT

## 2018-04-13 PROCEDURE — 80048 BASIC METABOLIC PNL TOTAL CA: CPT

## 2018-04-13 PROCEDURE — 87040 BLOOD CULTURE FOR BACTERIA: CPT

## 2018-04-13 PROCEDURE — 94664 DEMO&/EVAL PT USE INHALER: CPT

## 2018-04-13 PROCEDURE — 96372 THER/PROPH/DIAG INJ SC/IM: CPT

## 2018-04-13 PROCEDURE — G0378 HOSPITAL OBSERVATION PER HR: HCPCS

## 2018-04-13 PROCEDURE — 99285 EMERGENCY DEPT VISIT HI MDM: CPT

## 2018-04-13 PROCEDURE — 71045 X-RAY EXAM CHEST 1 VIEW: CPT

## 2018-04-13 PROCEDURE — 87804 INFLUENZA ASSAY W/OPTIC: CPT

## 2018-04-13 PROCEDURE — 83880 ASSAY OF NATRIURETIC PEPTIDE: CPT

## 2018-04-13 PROCEDURE — 87641 MR-STAPH DNA AMP PROBE: CPT

## 2018-04-13 PROCEDURE — 90732 PPSV23 VACC 2 YRS+ SUBQ/IM: CPT

## 2018-04-13 PROCEDURE — 82550 ASSAY OF CK (CPK): CPT

## 2018-04-13 PROCEDURE — 71275 CT ANGIOGRAPHY CHEST: CPT

## 2018-04-13 PROCEDURE — 85730 THROMBOPLASTIN TIME PARTIAL: CPT

## 2018-04-13 PROCEDURE — 85610 PROTHROMBIN TIME: CPT

## 2018-04-13 PROCEDURE — 96374 THER/PROPH/DIAG INJ IV PUSH: CPT

## 2018-04-13 PROCEDURE — 93005 ELECTROCARDIOGRAM TRACING: CPT

## 2018-04-13 PROCEDURE — 82805 BLOOD GASES W/O2 SATURATION: CPT

## 2018-04-13 PROCEDURE — 96376 TX/PRO/DX INJ SAME DRUG ADON: CPT

## 2018-04-13 PROCEDURE — 36600 WITHDRAWAL OF ARTERIAL BLOOD: CPT

## 2018-04-13 PROCEDURE — 80053 COMPREHEN METABOLIC PANEL: CPT

## 2018-04-13 PROCEDURE — 85025 COMPLETE CBC W/AUTO DIFF WBC: CPT

## 2018-04-13 PROCEDURE — 84484 ASSAY OF TROPONIN QUANT: CPT

## 2018-04-13 RX ADMIN — HEPARIN SODIUM SCH UNITS: 10000 INJECTION, SOLUTION INTRAVENOUS; SUBCUTANEOUS at 16:00

## 2018-04-13 RX ADMIN — OSELTAMIVIR PHOSPHATE SCH MG: 75 CAPSULE ORAL at 20:55

## 2018-04-13 RX ADMIN — GABAPENTIN SCH MG: 300 CAPSULE ORAL at 20:56

## 2018-04-13 RX ADMIN — ASPIRIN 81 MG SCH MG: 81 TABLET ORAL at 20:56

## 2018-04-13 RX ADMIN — GUAIFENESIN AND CODEINE PHOSPHATE PRN ML: 10; 100 LIQUID ORAL at 20:57

## 2018-04-13 RX ADMIN — BETHANECHOL CHLORIDE SCH MG: 25 TABLET ORAL at 20:58

## 2018-04-13 RX ADMIN — BETHANECHOL CHLORIDE SCH MG: 25 TABLET ORAL at 20:55

## 2018-04-13 RX ADMIN — ACETAMINOPHEN PRN MG: 325 TABLET ORAL at 18:44

## 2018-04-13 RX ADMIN — IPRATROPIUM BROMIDE AND ALBUTEROL SULFATE SCH AMPULE: .5; 3 SOLUTION RESPIRATORY (INHALATION) at 19:06

## 2018-04-13 RX ADMIN — Medication SCH ML: at 20:58

## 2018-04-13 RX ADMIN — IPRATROPIUM BROMIDE AND ALBUTEROL SULFATE SCH AMPULE: .5; 3 SOLUTION RESPIRATORY (INHALATION) at 17:04

## 2018-04-13 RX ADMIN — AZITHROMYCIN SCH MG: 250 TABLET, FILM COATED ORAL at 15:15

## 2018-04-13 NOTE — RADRPT
EXAM DATE/TIME:  04/13/2018 13:27 

 

[facility] COMPARISON:     

CT PULMONARY ANGIOGRAM, July 13, 2017, 18:44.

 

 

INDICATIONS :     

Short of breath.  Cough. 

                     

 

IV CONTRAST:     

75 cc Omnipaque 350 (iohexol) IV 

 

 

RADIATION DOSE:     

18.68 CTDIvol (mGy) 

 

 

MEDICAL HISTORY :     

Gastroesophageal reflux disease. Hernia, hiatal. Ulcers.Hypertension. 

 

SURGICAL HISTORY :      

CABG Hysterectomy.Appendectomy.Cholecystectomy. Tubal ligation.  Loop recorder. 

 

ENCOUNTER:      

Initial

 

ACUITY:      

1 day

 

PAIN SCALE:      

3/10

 

LOCATION:       

Bilateral chest 

 

TECHNIQUE:     

Volumetric scanning of the chest was performed using a pulmonary embolism protocol MIP images were re
constructed.  Using automated exposure control and adjustment of the mA and/or kV according to patien
t size, radiation dose was kept as low as reasonably achievable to obtain optimal diagnostic quality 
images.   DICOM format image data is available electronically for review and comparison.  

 

Follow-up recommendations for detected pulmonary nodules are based at a minimum on nodule size and pa
tient risk factors according to Fleischner Society Guidelines.

 

FINDINGS:     

 

PULMONARY ARTERIES:

No filling defects are seen in the pulmonary arteries through the segmental level.

 

LUNGS:     

There is no consolidation or pneumothorax .  No concerning pulmonary nodule is visualized.

 

PLEURAE:     

There is no pleural thickening or pleural effusion.

 

MEDIASTINUM:     

Coronary catheterizations. The heart is otherwise unremarkable without significant pericardial effusi
on. Thoracic aorta is non-aneurysmal without dissection.

 

MUSCULOSKELETAL:     

Within normal limits for patient age.

 

MISCELLANEOUS:     

The visualized upper abdominal organs demonstrate no acute abnormality.

 

CONCLUSION:     

1. No CT evidence for pulmonary artery embolism as questioned.

2. Coronary artery calcifications.

3. Otherwise, unremarkable CTA examination of the chest.

 

 

 

 

 Chivo Scott MD on April 13, 2018 at 14:02           

Board Certified Radiologist.

 This report was verified electronically.

## 2018-04-13 NOTE — RADRPT
EXAM DATE/TIME:  04/13/2018 12:42 

 

[facility] COMPARISON:     

CHEST SINGLE AP, July 13, 2017, 16:21.

 

                     

INDICATIONS :     

Short of breath.

                     

 

MEDICAL HISTORY :     

Gastroesophageal reflux disease.  Lupus.     Hypertension. asthma, hiatal hernia, gastroparesis   

 

SURGICAL HISTORY :     

Appendectomy. Cholecystectomy. Hysterectomy. CABG. left hip replacement, loop recorder, hernia repair
, nissen

 

ENCOUNTER:     

Initial                                        

 

ACUITY:     

1 day      

 

PAIN SCORE:     

0/10

 

LOCATION:      

chest 

 

FINDINGS:     

A single view of the chest demonstrates the lungs to be symmetrically aerated without evidence of mas
s, infiltrate or effusion.  The cardiomediastinal contours are unremarkable.  External fixation is ev
ident. Osseous structures are intact.

 

CONCLUSION:     No acute disease.  

 

 

 

 Michael Sierra MD FACR on April 13, 2018 at 12:49           

Board Certified Radiologist.

 This report was verified electronically.

## 2018-04-13 NOTE — HHI.HP
__________________________________________________





HPI


Service


[facility]


Primary Care Physician


Terry Casiano MD


Admission Diagnosis





FLU, ASTHMA EXACERBATION


Diagnoses:  


(1) Influenza A


Diagnosis:  Principal





(2) Bronchiolitis


Diagnosis:  Principal





Chief Complaint:  


Cough,


Travel History


International Travel<30 Days:  No


Contact w/Intl Traveler <30 Da:  No


Traveled to Known Affected Are:  No


History of Present Illness


57-year-old  female with rather complex medical history who presented 

to the emergency department at the request of her primary medical doctor's 

office because of shortness of breath and dyspnea.  Patient states that she "

did not feel good".  On Monday she states that she started having body aches, 

lightheadedness and she did well after that however she started feeling bad 

again last evening.  This morning she went to her primary medical doctor's 

office Dr. Casiano and she indicates that she was having difficulty in 

breathing and significant cough so he referred her to go to the emergency 

department for evaluation.  Patient had workup done emergency department found 

to have influenza a infection.  With the patient's complex medical history, 

immunosuppression secondary to medication for her autoimmune disorders, patient'

s concern for rapid worsening requiring intubation is recommended by the ER 

physician that the patient be observed in the hospital for further evaluation 

and management.  Patient denies any fever, chills, nausea, vomiting, diarrhea, 

constipation.  States that she does been having difficulty breathing.  She has 

had a dry nonproductive cough.  In the emergency department there are no signs 

of hypoxia, however she does have a rather deep wheezing cough.  However 

examination her lungs are clear.  Patient will be placed in the ICU for close 

monitoring and if patient D escalates rather quickly she will be able to have 

closer monitoring.





Review of Systems


Respiratory:  COMPLAINS OF: Cough, Shortness of breath


Except as stated in HPI:  all other systems reviewed are Neg





Past Family Social History


Past Medical History


Lupus


Raynauds


Osteoarthritis


Hypertension


Migraines


History TIA


Irritable bowel syndrome


Gastroparesis


History of Atrial flutter


History of coronary artery spasms


Restless leg syndrome


Past Surgical History


Appendectomy


Tubal ligation


Multiple orthopedic surgeries


Vaginal cyst removal


Pilonidal cyst removal


Hysterectomy


Cholecystectomy


Nissen fundoplication


Cardiac catheterization


Bladder surgery


Coronary artery bypass graft


Carpal tunnel surgery


Bilateral hip replacement


EGD with esophageal dilatation


Reported Medications





Reported Meds & Active Scripts


Active


Aspirin Low Dose (Aspirin) 81 Mg Chew 81 Mg CHEW BID 30 Days


Reported


Methotrexate 2.5 Mg Tab 15 Mg PO Q7D


Bethanechol 50 Mg Tab 50 Mg PO QID


Ambien (Zolpidem Tartrate) 10 Mg Tab 10 Mg PO HS PRN


Spironolactone 25 Mg Tab 25 Mg PO DAILY


Clindamycin (Clindamycin HCl) 300 Mg Cap 600 Mg PO ONCE PRN


     4 hours prior to dental procedure


Transderm-Scop (Scopolamine) 1 Mg/3 Days Dis 1 Patch T-DERMAL Q3D PRN


Dexamethasone 4 Mg Tab 4 Mg PO AS DIRECTED


     5 tabs 3x daily for 5-7 days then wean down


Lidoderm (Lidocaine) 5 % Adh..patch 1 Patch T-DERMAL Q12HR PRN


Folic Acid 1 Mg Tablet 1 Mg PO HS


Tylenol (Acetaminophen) 325 Mg Tab 650 Mg PO HS PRN


Gabapentin 600 Mg Tab 600 Mg PO BID


Tessalon Perles (Benzonatate) 100 Mg Cap 200 Mg PO TID PRN


Zofran Odt (Ondansetron Odt) 8 Mg Tab 8 Mg SL Q8H PRN


Butorphanol Nasal Spray (Butorphanol Tartrate) 10 Mg/Ml Soln 1 Spray NASAL Q4HR 

PRN


     in one nostril (1 mg). If pain not reliefed in 60-90 minutes, a 1 mg


     repeat dose may be given.  May repeat this in 3-4 hrs if needed.


Epipen 2-Frank Inj (Epinephrine) 0.3 Mg/0.3 Ml Pfpen 0.3 Mg IM ONCE PRN


Xopenex Hfa 15 GM Inh (Levalbuterol 15 GM Inh) 45 Mcg/Act Aer 45 Mcg INH Q6HR


     Shake well before using. (1 puff = 45 mcg)


Xopenex Neb (Levalbuterol HCl) 0.63 Mg/3 Ml Neb 0.63 Mg NEB QID


Ipratropium Neb (Ipratropium Bromide) 0.5 Mg/2.5 Ml Amp 0.5 Mg NEB Q6HR NEB


Amlodipine (Amlodipine Besylate) 2.5 Mg Tab 2.5 Mg PO DAILY PRN


Singulair (Montelukast Sodium) 10 Mg Tab 10 Mg PO HS


Incruse Ellipta Inh (Umeclidinium Bromide Inh) 0.0625 Mg/Act Inh 1 Puff INH 

DAILY


Tramadol (Tramadol HCl) 50 Mg Tab 50 Mg PO QID


Cartia Xt (Diltiazem ER 24 HR) 120 Mg Caper 120 Mg PO DAILY


Azithromycin 250 Mg Tab 250 Mg PO HS


     On for 1 week, off for 1 week


Protonix (Pantoprazole Sodium) 40 Mg Tab 40 Mg PO HS


Allergies:  


Coded Allergies:  


     amlodipine (Verified  Allergy, Severe, RASH ON LEGS, EDEMA, 18)


 FOGGY BRAIN


     fluticasone (Verified  Allergy, Severe, Shortness of Breath, 18)


     fluticasone furoate (Verified  Allergy, Severe, Shortness of Breath, )


     lovastatin (Verified  Allergy, Severe, COUGH, BREATHING DIFFICULTY, HEADACH

, 18)


     metoclopramide (Verified  Allergy, Severe, NEUROLOGICAL EFFECT, WORSENS 

RESTLEG AND CAUSES TREMOR, 18)


 NEUROLOGICAL EFFECT, WORSENS RESTLEG AND CAUSES TREMOR


     penicillin G (Verified  Allergy, Severe, Anaphylaxis, 18)


     pravastatin (Verified  Allergy, Severe, MUSCLE ACHES, JOINT PAIN, 

SENSITIVE SKIN, 18)


 FOGGY BRAIN


     salmeterol (Verified  Allergy, Severe, Shortness of Breath, 18)


     simvastatin (Verified  Allergy, Severe, MUSCLE ACHES, JOINT PAIN, 

SENSITIVE SKIN, 18)


 FOGGY BRAIN


     adhesive (Verified  Allergy, Mild, RASH, 18)


 MAY USE PAPER TAPE ONLY AND TEGADERM


     hydroxychloroquine (Verified  Adverse Reaction, Severe, MUSCLE ACHING, )


 VISUAL DISTURBANCES


     atenolol (Verified  Adverse Reaction, Intermediate, SHORTNESS OF BREATH , 

SEVERE LETHARGIC AND EXHUSTED, WEIGHT , 18)


 PATIENT REPORTS SHE STOPPED TAKING THIS MEDICATION 5 WEEKS


 AGO - SHE WAS SLOWLY WEANED OFF PER DR. JOLLEY


     atorvastatin (Verified  Adverse Reaction, Intermediate, NEURALGIA, 18)


 FOGGY BRAIN


Uncoded Allergies:  


     NICKEL (Allergy, Intermediate, RASH, BURN, 6/15/15)


 CONFIRMED 14 TM


     STERI STRIPS (Allergy, Intermediate, RASH, BURN, 6/15/15)


 CONFIRMED 14 TM


Family History


Family history reviewed and significant for father  from brain cancer, 

mother had arthritis and hypertension, sister with congestive heart failure


Social History


Patient denies any tobacco or illicit drug, patient states that she does drink 

alcohol rarely





Physical Exam


Vital Signs





Vital Signs








  Date Time  Temp Pulse Resp B/P (MAP) Pulse Ox O2 Delivery O2 Flow Rate FiO2


 


18 14:10  82 20 155/94 (114) 99 Room Air  


 


18 14:02  84 20 184/140 (155) 97 Room Air  


 


18 13:12  89 22 185/85 (118) 100 Room Air  


 


18 12:38  92   98 Room Air  


 


18 12:30     100 Room Air  


 


18 12:30     100 Room Air  


 


18 12:30  86 20 187/117 (140) 100 Room Air  


 


18 12:12  90 20  100 Room Air  


 


18 12:09 97.5 77 20 190/102 (131) 100   








Physical Exam


GENERAL: Well-developed, well-nourished, in no acute distress. alert and 

orientated


HEENT: Head is normocephalic without any lesions or masses noted. Facial 

features are symmetric. Eyes: Pupils equal round reactive to light. Extraocular 

muscles are intact. Conjunctivae were clear. Oropharyngeal: Pharynx without any 

erythema edema. Tongue is midline without deviation. Buccal mucosa is moist 

without any masses or lesions


NECK: Supple without any masses. Trachea midline no deviation. No JVD, no 

bruits are appreciated


CARDIAC: Regular rhythm, regular rate. S1/S2 are heard. No murmurs gallops or 

rubs.


LUNGS: Clear to auscultation bilaterally. No wheeze, rhonchi or rales. No use 

of accessory muscles on inspiration or expiration.  Patient does have deep 

wheezing cough, however lungs are clear


ABDOMEN: Soft, nontender. Nondistended. Bowel sounds heard in all 4 quadrants. 

No organomegaly or masses. Negative rebound, negative guarding,


EXTREMITIES: No edema, pulses are equal bilaterally. No cyanosis or clubbing


NEUROLOGY: Mood and affect appear appropriate. Cranial nerves II through XII 

grossly intact. Muscle strength 5/5 in upper and lower extremities bilaterally. 

Deep tendon reflexes are 2+ in upper and lower extremities bilaterally.


Laboratory





Laboratory Tests








Test


  18


12:20


 


White Blood Count 7.9 


 


Red Blood Count 4.60 


 


Hemoglobin 13.5 


 


Hematocrit 41.2 


 


Mean Corpuscular Volume 89.6 


 


Mean Corpuscular Hemoglobin 29.3 


 


Mean Corpuscular Hemoglobin


Concent 32.7 


 


 


Red Cell Distribution Width 14.9 


 


Platelet Count 321 


 


Mean Platelet Volume 8.6 


 


Neutrophils (%) (Auto) 67.1 


 


Lymphocytes (%) (Auto) 25.0 


 


Monocytes (%) (Auto) 6.1 


 


Eosinophils (%) (Auto) 1.2 


 


Basophils (%) (Auto) 0.6 


 


Neutrophils # (Auto) 5.3 


 


Lymphocytes # (Auto) 2.0 


 


Monocytes # (Auto) 0.5 


 


Eosinophils # (Auto) 0.1 


 


Basophils # (Auto) 0.0 


 


CBC Comment DIFF FINAL 


 


Differential Comment  


 


Prothrombin Time 10.8 


 


Prothromb Time International


Ratio 1.1 


 


 


Activated Partial


Thromboplast Time 25.2 


 


 


Blood Urea Nitrogen 8 


 


Creatinine 0.80 


 


Random Glucose 95 


 


Total Protein 8.1 


 


Albumin 4.4 


 


Calcium Level 9.3 


 


Alkaline Phosphatase 106 


 


Aspartate Amino Transf


(AST/SGOT) 18 


 


 


Alanine Aminotransferase


(ALT/SGPT) 23 


 


 


Total Bilirubin 0.3 


 


Sodium Level 139 


 


Potassium Level 3.7 


 


Chloride Level 105 


 


Carbon Dioxide Level 25.9 


 


Anion Gap 8 


 


Estimat Glomerular Filtration


Rate 74 


 


 


Total Creatine Kinase 88 


 


Troponin I LESS THAN 0.02 


 


B-Type Natriuretic Peptide 48 














 Date/Time


Source Procedure


Growth Status


 


 


 18 12:26


Blood Peripheral Aerobic Blood Culture


Pending Received


 


 18 12:26


Blood Peripheral Anaerobic Blood Culture


Pending Received


 


 18 12:26


Nasal Washing Influenza Types A,B Antigen (KACIE) - Final


Positive For Flu A Antigen Complete








Result Diagram:  


18 1220                                                                   

             18 1220





Imaging





Last Impressions








Chest X-Ray 185 Signed





Impressions: 





 Service Date/Time:  2018 12:42 - CONCLUSION: No acute 

disease. 





       Michael Sierra MD  FACR


 


CT Angiography 18 Signed





Impressions: 





 Service Date/Time:  2018 13:27 - CONCLUSION:  1. No CT 





 evidence for pulmonary artery embolism as questioned. 2. Coronary artery 





 calcifications. 3. Otherwise, unremarkable CTA examination of the chest.      





 Alireza Bozorgmanesh, MD Caprini VTE Risk Assessment


Caprini VTE Risk Assessment:  Mod/High Risk (score >= 2)


Caprini Risk Assessment Model











 Point Value = 1          Point Value = 2  Point Value = 3  Point Value = 5


 


Age 41-60


Minor surgery


BMI > 25 kg/m2


Swollen legs


Varicose veins


Pregnancy or postpartum


History of unexplained or recurrent


   spontaneous 


Oral contraceptives or hormone


   replacement


Sepsis (< 1 month)


Serious lung disease, including


   pneumonia (< 1 month)


Abnormal pulmonary function


Acute myocardial infarction


Congestive heart failure (< 1 month)


History of inflammatory bowel disease


Medical patient at bed rest Age 61-74


Arthroscopic surgery


Major open surgery (> 45 min)


Laparoscopic surgery (> 45 min)


Malignancy


Confined to bed (> 72 hours)


Immobilizing plaster cast


Central venous access Age >= 75


History of VTE


Family history of VTE


Factor V Leiden


Prothrombin [id number]


Lupus anticoagulant


Anticardiolipin antibodies


Elevated serum homocysteine


Heparin-induced thrombocytopenia


Other congenital or acquired


   thrombophilia Stroke (< 1 month)


Elective arthroplasty


Hip, pelvis, or leg fracture


Acute spinal cord injury (< 1 month)








Prophylaxis Regimen











   Total Risk


Factor Score Risk Level Prophylaxis Regimen


 


0-1      Low Early ambulation


 


2 Moderate Order ONE of the following:


*Sequential Compression Device (SCD)


*Heparin 5000 units SQ BID


 


3-4 Higher Order ONE of the following medications:


*Heparin 5000 units SQ TID


*Enoxaparin/Lovenox 40 mg SQ daily (WT < 150 kg, CrCl > 30 mL/min)


*Enoxaparin/Lovenox 30 mg SQ daily (WT < 150 kg, CrCl > 10-29 mL/min)


*Enoxaparin/Lovenox 30 mg SQ BID (WT < 150 kg, CrCl > 30 mL/min)


AND/OR


*Sequential Compression Device (SCD)


 


5 or more Highest Order ONE of the following medications:


*Heparin 5000 units SQ TID (Preferred with Epidurals)


*Enoxaparin/Lovenox 40 mg SQ daily (WT < 150 kg, CrCl > 30 mL/min)


*Enoxaparin/Lovenox 30 mg SQ daily (WT < 150 kg, CrCl > 10-29 mL/min)


*Enoxaparin/Lovenox 30 mg SQ BID (WT < 150 kg, CrCl > 30 mL/min)


AND


*Sequential Compression Device (SCD)











Assessment and Plan


Assessment and Plan


Influenza A


   Symptomatic treatment with Robitussin-AC cough medicine


   Start Tamiflu 75 mg twice daily





Bronchiolitis likely secondary to the above or bronchospasm with history of 

asthma


   Continue O2 segmentation maintain O2 sats greater than 92%


   Continue Zithromax


   Continue duo nebs every 4 hours and every 2 hours as needed


   May use racemic epinephrine if she is not responding to DuoNeb's


   Start Solu-Medrol 60 mg every 6 hours





Hypertension, hyperlipidemia, coronary disease, TIA


   Continue home medications





Lupus,


   Continue home medications





Gastroparesis


   Continue home medications





DVT prevention


   Subcutaneous heparin


   Sequential compression devices











Chago Johnson 2018 15:12

## 2018-04-14 VITALS — RESPIRATION RATE: 12 BRPM | SYSTOLIC BLOOD PRESSURE: 118 MMHG | DIASTOLIC BLOOD PRESSURE: 66 MMHG | HEART RATE: 84 BPM

## 2018-04-14 VITALS
OXYGEN SATURATION: 100 % | DIASTOLIC BLOOD PRESSURE: 60 MMHG | TEMPERATURE: 97.9 F | SYSTOLIC BLOOD PRESSURE: 107 MMHG | RESPIRATION RATE: 18 BRPM | HEART RATE: 82 BPM

## 2018-04-14 VITALS — SYSTOLIC BLOOD PRESSURE: 111 MMHG | HEART RATE: 84 BPM | RESPIRATION RATE: 13 BRPM | DIASTOLIC BLOOD PRESSURE: 78 MMHG

## 2018-04-14 VITALS — RESPIRATION RATE: 24 BRPM | DIASTOLIC BLOOD PRESSURE: 57 MMHG | HEART RATE: 86 BPM | SYSTOLIC BLOOD PRESSURE: 117 MMHG

## 2018-04-14 VITALS — HEART RATE: 90 BPM | RESPIRATION RATE: 18 BRPM | SYSTOLIC BLOOD PRESSURE: 106 MMHG | DIASTOLIC BLOOD PRESSURE: 54 MMHG

## 2018-04-14 VITALS — RESPIRATION RATE: 13 BRPM | HEART RATE: 86 BPM | DIASTOLIC BLOOD PRESSURE: 77 MMHG | SYSTOLIC BLOOD PRESSURE: 133 MMHG

## 2018-04-14 VITALS — RESPIRATION RATE: 20 BRPM | DIASTOLIC BLOOD PRESSURE: 70 MMHG | SYSTOLIC BLOOD PRESSURE: 149 MMHG | HEART RATE: 104 BPM

## 2018-04-14 VITALS — DIASTOLIC BLOOD PRESSURE: 70 MMHG | HEART RATE: 106 BPM | SYSTOLIC BLOOD PRESSURE: 132 MMHG | RESPIRATION RATE: 22 BRPM

## 2018-04-14 VITALS
TEMPERATURE: 98 F | OXYGEN SATURATION: 100 % | DIASTOLIC BLOOD PRESSURE: 67 MMHG | HEART RATE: 88 BPM | SYSTOLIC BLOOD PRESSURE: 132 MMHG | RESPIRATION RATE: 17 BRPM

## 2018-04-14 VITALS
DIASTOLIC BLOOD PRESSURE: 82 MMHG | HEART RATE: 92 BPM | TEMPERATURE: 98.5 F | SYSTOLIC BLOOD PRESSURE: 156 MMHG | RESPIRATION RATE: 19 BRPM

## 2018-04-14 VITALS — SYSTOLIC BLOOD PRESSURE: 120 MMHG | DIASTOLIC BLOOD PRESSURE: 70 MMHG | HEART RATE: 88 BPM | RESPIRATION RATE: 18 BRPM

## 2018-04-14 VITALS — OXYGEN SATURATION: 98 %

## 2018-04-14 LAB
BASOPHILS # BLD AUTO: 0.1 TH/MM3 (ref 0–0.2)
BASOPHILS NFR BLD: 0.7 % (ref 0–2)
BUN SERPL-MCNC: 9 MG/DL (ref 7–18)
CALCIUM SERPL-MCNC: 9.4 MG/DL (ref 8.5–10.1)
CHLORIDE SERPL-SCNC: 107 MEQ/L (ref 98–107)
CREAT SERPL-MCNC: 0.67 MG/DL (ref 0.5–1)
EOSINOPHIL # BLD: 0 TH/MM3 (ref 0–0.4)
EOSINOPHIL NFR BLD: 0 % (ref 0–4)
ERYTHROCYTE [DISTWIDTH] IN BLOOD BY AUTOMATED COUNT: 15.3 % (ref 11.6–17.2)
GFR SERPLBLD BASED ON 1.73 SQ M-ARVRAT: 91 ML/MIN (ref 89–?)
GLUCOSE SERPL-MCNC: 142 MG/DL (ref 74–106)
HCO3 BLD-SCNC: 22.9 MEQ/L (ref 21–32)
HCT VFR BLD CALC: 37.3 % (ref 35–46)
HGB BLD-MCNC: 12.6 GM/DL (ref 11.6–15.3)
LYMPHOCYTES # BLD AUTO: 1.1 TH/MM3 (ref 1–4.8)
LYMPHOCYTES NFR BLD AUTO: 10.4 % (ref 9–44)
MCH RBC QN AUTO: 30.8 PG (ref 27–34)
MCHC RBC AUTO-ENTMCNC: 33.9 % (ref 32–36)
MCV RBC AUTO: 91 FL (ref 80–100)
MONOCYTE #: 0.1 TH/MM3 (ref 0–0.9)
MONOCYTES NFR BLD: 0.7 % (ref 0–8)
NEUTROPHILS # BLD AUTO: 9.7 TH/MM3 (ref 1.8–7.7)
NEUTROPHILS NFR BLD AUTO: 88.2 % (ref 16–70)
PLATELET # BLD: 283 TH/MM3 (ref 150–450)
PMV BLD AUTO: 8.6 FL (ref 7–11)
RBC # BLD AUTO: 4.09 MIL/MM3 (ref 4–5.3)
SODIUM SERPL-SCNC: 140 MEQ/L (ref 136–145)
WBC # BLD AUTO: 11 TH/MM3 (ref 4–11)

## 2018-04-14 RX ADMIN — OSELTAMIVIR PHOSPHATE SCH MG: 75 CAPSULE ORAL at 09:59

## 2018-04-14 RX ADMIN — IPRATROPIUM BROMIDE AND ALBUTEROL SULFATE SCH AMPULE: .5; 3 SOLUTION RESPIRATORY (INHALATION) at 07:52

## 2018-04-14 RX ADMIN — GUAIFENESIN AND CODEINE PHOSPHATE PRN ML: 10; 100 LIQUID ORAL at 10:10

## 2018-04-14 RX ADMIN — BETHANECHOL CHLORIDE SCH MG: 25 TABLET ORAL at 09:54

## 2018-04-14 RX ADMIN — AZITHROMYCIN SCH MG: 250 TABLET, FILM COATED ORAL at 09:54

## 2018-04-14 RX ADMIN — Medication SCH ML: at 10:01

## 2018-04-14 RX ADMIN — ASPIRIN 81 MG SCH MG: 81 TABLET ORAL at 09:54

## 2018-04-14 RX ADMIN — HEPARIN SODIUM SCH UNITS: 10000 INJECTION, SOLUTION INTRAVENOUS; SUBCUTANEOUS at 05:38

## 2018-04-14 RX ADMIN — ACETAMINOPHEN PRN MG: 325 TABLET ORAL at 00:32

## 2018-04-14 RX ADMIN — GABAPENTIN SCH MG: 300 CAPSULE ORAL at 09:54

## 2018-04-14 NOTE — HHI.DCPOC
Discharge Care Plan


Diagnosis:  


(1) Influenza A


(2) Bronchiolitis


Goals to Promote Your Health


* To prevent worsening of your condition and complications


* To maintain your health at the optimal level


Directions to Meet Your Goals


*** Take your medications as prescribed


*** Follow your dietary instruction


*** Follow activity as directed








*** Keep your appointments as scheduled


*** Take your immunizations and boosters as scheduled


*** If your symptoms worsen call your PCP, if no PCP go to Urgent Care Center 

or Emergency Room***


*** Smoking is Dangerous to Your Health. Avoid second hand smoke***


***Call the 24-hour hour crisis hotline for domestic abuse at 1-834.739.2053***











Chago Johnson Apr 14, 2018 07:29

## 2018-04-14 NOTE — EKG
Date Performed: 2018       Time Performed: 12:41:50

 

PTAGE:      57 years

 

EKG:      Sinus rhythm 

 

 POSSIBLE LEFT ATRIAL ENLARGEMENT NONSPECIFIC ST & T-WAVE ABNORMALITY BORDERLINE ECG Since the 

 

 PREVIOUS TRACING            , no significant change noted PREVIOUS TRACIN2017 10.23

 

DOCTOR:   Aubrie Mcconnell  Interpretating Date/Time  2018 17:00:10

## 2018-04-14 NOTE — HHI.DS
__________________________________________________





Discharge Summary


Admission Date


Apr 13, 2018 at 13:44


Discharge Date:  Apr 14, 2018


Admitting Diagnosis





FLU, ASTHMA EXACERBATION





(1) Influenza A


ICD Code:  J10.1 - Influenza due to other identified influenza virus with other 

respiratory manifestations


Diagnosis:  Principal





(2) Bronchiolitis


ICD Code:  J21.9 - Acute bronchiolitis, unspecified


Diagnosis:  Principal





Procedures


None


Brief History - From Admission


57-year-old  female with rather complex medical history who presented 

to the emergency department at the request of her primary medical doctor's 

office because of shortness of breath and dyspnea.  Patient states that she "

did not feel good".  On Monday she states that she started having body aches, 

lightheadedness and she did well after that however she started feeling bad 

again last evening.  This morning she went to her primary medical doctor's 

office Dr. Casiano and she indicates that she was having difficulty in 

breathing and significant cough so he referred her to go to the emergency 

department for evaluation.  Patient had workup done emergency department found 

to have influenza a infection.  With the patient's complex medical history, 

immunosuppression secondary to medication for her autoimmune disorders, patient'

s concern for rapid worsening requiring intubation is recommended by the ER 

physician that the patient be observed in the hospital for further evaluation 

and management.  Patient denies any fever, chills, nausea, vomiting, diarrhea, 

constipation.  States that she does been having difficulty breathing.  She has 

had a dry nonproductive cough.  In the emergency department there are no signs 

of hypoxia, however she does have a rather deep wheezing cough.  However 

examination her lungs are clear.  Patient will be placed in the ICU for close 

monitoring and if patient D escalates rather quickly she will be able to have 

closer monitoring.


CBC/BMP:  


4/14/18 0547                                                                   

             4/14/18 0547





Significant Findings





Laboratory Tests








Test


  4/13/18


12:20 4/13/18


15:06 4/14/18


00:45 4/14/18


05:47


 


Estimat Glomerular Filtration


Rate 74 ML/MIN


(>89) 


  


  


 


 


Troponin I


  LESS THAN 0.02


NG/ML 


  


  


 


 


Arterial Blood pH


  


  7.48


(7.380-7.420) 


  


 


 


Arterial Blood Partial


Pressure CO2 


  30 mmHG


(38-42) 


  


 


 


Neutrophils (%) (Auto)


  


  


  


  88.2 %


(16.0-70.0)


 


Neutrophils # (Auto)


  


  


  


  9.7 TH/MM3


(1.8-7.7)


 


Random Glucose


  


  


  


  142 MG/DL


()








Imaging





Last Impressions








Chest X-Ray 4/13/18 1215 Signed





Impressions: 





 Service Date/Time:  Friday, April 13, 2018 12:42 - CONCLUSION: No acute 

disease. 





       Michael Sierra MD  FACR


 


CT Angiography 4/13/18 1215 Signed





Impressions: 





 Service Date/Time:  Friday, April 13, 2018 13:27 - CONCLUSION:  1. No CT 





 evidence for pulmonary artery embolism as questioned. 2. Coronary artery 





 calcifications. 3. Otherwise, unremarkable CTA examination of the chest.      





 Chivo Scott MD 








PE at Discharge


GENERAL: Well-developed, well-nourished, in no acute distress. alert and 

orientated


HEENT: Head is normocephalic without any lesions or masses noted. Facial 

features are symmetric. Eyes: Extraocular muscles are intact. Conjunctivae were 

clear.


NECK: Supple without any masses. Trachea midline no deviation. No JVD,


CARDIAC: Regular rhythm, regular rate. S1/S2 are heard. No murmurs gallops or 

rubs.


LUNGS: Clear to auscultation bilaterally. No wheeze, rhonchi or rales. No use 

of accessory muscles on inspiration or expiration.


ABDOMEN: Soft, nontender. Nondistended. Bowel sounds heard in all 4 quadrants. 

No organomegaly or masses. Negative rebound, negative guarding


EXTREMITIES: No edema, pulses are equal bilaterally. No cyanosis or clubbing


NEUROLOGY: Mood and affect appear appropriate. Cranial nerves II through XII 

grossly intact.  Moving all extremities, speech clear


Hospital Course


57-year-old  female with rather complex medical history who was found 

to have influenza A infection and was having significant coughing with upper 

airway wheezes representing possible bronchiolitis.  Patient has history of 

decompensation rather quickly it is recommended by ER physician that the 

patient be observed in the hospital overnight.  Patient did rather well.  

Nursing staff indicates that patient did not have any complications throughout 

the evening.  Her cough has improved and is controlled on medication.  Upon 

seeing the patient today she states that she is feeling much better and wants 

to go home.  Objective findings appear to be stable.  Clinically she has 

improved.  We will plan discharge accordingly.


Pt Condition on Discharge:  Stable


Discharge Disposition:  Discharge Home


Discharge Time:  > 30 minutes


Discharge Instructions


DIET: Follow Instructions for:  Heart Healthy Diet


Activities you can perform:  Regular-No Restrictions


Follow up Referrals:  


PCP Follow-up - 1 Week





New Medications:  


Oseltamivir (Tamiflu) 75 Mg Cap


75 MG PO BID for Influenza A for 5 Days, #10 CAP





[guaiFEN--20 MG/10ML LIQ] () 10 ML SYRP


10 ML PO Q4H PRN for COUGH, #8 OZ





 


Continued Medications:  


Acetaminophen (Tylenol) 325 Mg Tab


650 MG PO HS PRN for PAIN SCALE 1 TO 10, TAB 0 Refills





Amlodipine (Amlodipine) 2.5 Mg Tab


2.5 MG PO DAILY PRN for SBP>160, DBP>90, #30 TAB 0 Refills





Aspirin (Aspirin Low Dose) 81 Mg Chew


81 MG CHEW BID for Prevent Blood Clot for 30 Days, #60 TAB 0 Refills





Azithromycin (Azithromycin) 250 Mg Tab


250 MG PO HS for Infection, TAB 0 Refills


On for 1 week, off for 1 week


Benzonatate (Tessalon Perles) 100 Mg Cap


200 MG PO TID PRN for COUGH, CAP 0 Refills





Bethanechol (Bethanechol) 50 Mg Tab


50 MG PO QID for Urinary Symptom Managemen, TAB 0 Refills





Butorphanol Nasal Spray (Butorphanol Nasal Spray) 10 Mg/Ml Soln


1 SPRAY NASAL Q4HR PRN for Pain Management, #2.5 ML


in one nostril (1 mg). If pain not reliefed in 60-90 minutes, a 1 mg


 repeat dose may be given.  May repeat this in 3-4 hrs if needed.


Clindamycin (Clindamycin) 300 Mg Cap


600 MG PO ONCE PRN for PRIOR TO PROCEDURE, CAP 0 Refills


4 hours prior to dental procedure


Dexamethasone (Dexamethasone) 4 Mg Tab


4 MG PO AS DIRECTED, TAB 0 Refills


5 tabs 3x daily for 5-7 days then wean down


Diltiazem ER 24 HR (Cartia Xt) 120 Mg Caper


120 MG PO DAILY, #30 CAP 0 Refills





Epinephrine Inj (Epipen 2-Frank Inj) 0.3 Mg/0.3 Ml Pfpen


0.3 MG IM ONCE PRN for ALLERGIC REACTION, #1 PACK 0 Refills





Folic Acid (Folic Acid) 1 Mg Tablet


1 MG PO HS





Gabapentin (Gabapentin) 600 Mg Tab


600 MG PO BID, #60 TAB 0 Refills





Ipratropium Neb (Ipratropium Neb) 0.5 Mg/2.5 Ml Amp


0.5 MG NEB Q6HR NEB for Breathing Treatment, NEBULE 0 Refills





Levalbuterol 15 GM Inh (Xopenex Hfa 15 GM Inh) 45 Mcg/Act Aer


45 MCG INH Q6HR, #1 INHALER 0 Refills


Shake well before using. (1 puff = 45 mcg)


Levalbuterol Neb (Xopenex Neb) 0.63 Mg/3 Ml Neb


0.63 MG NEB QID for Breathing Treatment, #120 NEBULE 0 Refills





Lidocaine (Lidoderm) 5 % Adh..patch


1 PATCH T-DERMAL Q12HR PRN for PAIN SCALE 1 TO 10





Methotrexate (Methotrexate) 2.5 Mg Tab


15 MG PO Q7D for AUTOIMMUNE , TAB 0 Refills





Montelukast (Singulair) 10 Mg Tab


10 MG PO HS, #30 TAB 0 Refills





Ondansetron Odt (Zofran Odt) 8 Mg Tab


8 MG SL Q8H PRN for NAUSEA OR VOMITING, TAB 0 Refills





Pantoprazole (Protonix) 40 Mg Tab


40 MG PO HS for Reflux, #30 TAB 0 Refills





Scopolamine (Transderm-Scop) 1 Mg/3 Days Dis


1 PATCH T-DERMAL Q3D PRN for motion sickness





Spironolactone (Spironolactone) 25 Mg Tab


25 MG PO DAILY for Prevent Heart Failure, #15 TAB 0 Refills





Tramadol (Tramadol) 50 Mg Tab


50 MG PO QID for PAIN, TAB 0 Refills





Umeclidinium Bromide Inh (Incruse Ellipta Inh) 0.0625 Mg/Act Inh


1 PUFF INH DAILY for Treat COPD, #1 INHALER 0 Refills





Zolpidem (Ambien) 10 Mg Tab


10 MG PO HS PRN for INSOMNIA, TAB 0 Refills

















Chago Johnson Apr 14, 2018 07:34

## 2022-12-12 NOTE — PD
HPI


Chief Complaint:  cough/sob


Time Seen by Provider:  12:05


Travel History


International Travel<30 days:  No


Contact w/Intl Traveler<30days:  No


Traveled to known affect area:  No





History of Present Illness


HPI


Patient is referred by Dr. Casiano for shortness of breath.  Patient does have 

a previous history of intubation.  While at his office patient had a pulse ox 

in the 70s apparently.  Patient comes in stating that she has had 2 days of 

nonstop ongoing cough, staccato-like really gets coughing fits and when she 

does becomes a bit more more difficult to breathe.  Patient is concerned 

because she has unfortunately been previously intubated.  Patient denies any 

associated factors such as fever or rash.





Please see allergy list is extensive


Past medical history significant for esophageal stretching, migraine history 

screen odds, TIA, anxiety lupus,, CABG in 2013, cardiomyopathy, history of 

asthma, gastroparesis, Nissen fundoplication, IBS, has also had appendectomy, 

cholecystectomy, GERD, uterine ablation, hysterectomy.





PFSH


Past Medical History


Hx Anticoagulant Therapy:  Yes


Arthritis:  Yes (generalized throughout body)


Asthma:  Yes


Autoimmune Disease:  Yes (Lupus)


Anxiety:  Yes


Depression:  No


Heart Rhythm Problems:  No (FLUTTERS AND SKIPPED BEATS)


Cancer:  Yes (PRE CANCEROUS UTERUS)


Cardiac Catheterization:  Yes


Cardiomyopathy:  Yes (CABG 2013)


Cardiovascular Problems:  Yes (CABG IN 2013, coronary artery spasms, cad)


High Cholesterol:  No


Chemotherapy:  No


Chest Pain:  Yes (THIS VISIT)


Congestive Heart Failure:  No


COPD:  No


Cerebrovascular Accident:  Yes


Diabetes:  No


Diminished Hearing:  No


Endocrine:  No


Gastrointestinal Disorders:  Yes (IBS,GASTROPARESIS / NISSEN FUNDIPLICATION)


GERD:  Yes


Genitourinary:  No


Headaches:  No


Hepatitis:  No


Hiatal Hernia:  Yes (REPAIRED)


Hypertension:  Yes


Immune Disorder:  Yes (Lupus)


Kidney Stones:  No


Musculoskeletal:  Yes (LOWER BACK SPONDYLOSIS, OSTEOARTHRITIS, radiculitis)


Neurologic:  Yes (SEVERE MIGRAINES, RESTLESS LEG,TIA, REYNAUDS, NEURITIS)


Psychiatric:  No


Reproductive:  Yes


Respiratory:  Yes (ASTHMA)


Immunizations Current:  Yes


Migraines:  Yes


Radiation Therapy:  No


Renal Failure:  No


Seizures:  No


Sickle Cell Disease:  No


Sleep Apnea:  No


Thyroid Disease:  No


Ulcer:  Yes


PNEUMOCCOCAL Vaccine (Year):  2008


Menopausal:  Yes


Tubal Ligation:  Yes (1988)





Past Surgical History


Abdominal Surgery:  Yes (abdominal mass removed, CHOLY,APPY)


AICD:  No


Appendectomy:  Yes (1970)


Arteriovenous Shunt:  No


Body Medical Devices:  LOOP RECORDER FOR ARRYTHMIA, 4 PLATES, 24 SCREWS CHEST


Cardiac Surgery:  Yes ( CABGX1 OCT. 2013, LOOP RECORDER IMPLANTED 1/2015, 

CARDIAC CATH, CABG, )


Cholecystectomy:  Yes (1999)


Coronary Artery Bypass Graft:  Yes (x1)


Ear Surgery:  No


Endocrine Surgery:  No


Eye Surgery:  No


Genitourinary Surgery:  Yes ( bladder lift X2)


Gynecologic Surgery:  Yes (cyst removed 1981,UTERINE ABLATION TOTAL 

HYSTERECTOMY 1994)


Hysterectomy:  Yes


Insulin Pump:  No


Joint Replacement:  Yes (RIGHT HIP REPLACEMENT)


Neurologic Surgery:  Yes (L4, L5, L6)


Oral Surgery:  Yes (esophageal stretching 2017)


Pacemaker:  No


Thoracic Surgery:  Yes (06/2015 STERNAL WIRES REMOVED)


Other Surgery:  Yes (SEE OTHER FOR LIST)





Social History


Alcohol Use:  Yes (SOCIAL)


Tobacco Use:  No


Substance Use:  No





Allergies-Medications


(Allergen,Severity, Reaction):  


Coded Allergies:  


     amlodipine (Verified  Allergy, Severe, RASH ON LEGS, EDEMA, 4/13/18)


 FOGGY BRAIN


     fluticasone (Verified  Allergy, Severe, Shortness of Breath, 4/13/18)


     fluticasone furoate (Verified  Allergy, Severe, Shortness of Breath, 4/13/ 18)


     lovastatin (Verified  Allergy, Severe, COUGH, BREATHING DIFFICULTY, HEADACH

, 4/13/18)


     metoclopramide (Verified  Allergy, Severe, NEUROLOGICAL EFFECT, WORSENS 

RESTLEG AND CAUSES TREMOR, 4/13/18)


 NEUROLOGICAL EFFECT, WORSENS RESTLEG AND CAUSES TREMOR


     penicillin G (Verified  Allergy, Severe, Anaphylaxis, 4/13/18)


     pravastatin (Verified  Allergy, Severe, MUSCLE ACHES, JOINT PAIN, 

SENSITIVE SKIN, 4/13/18)


 FOGGY BRAIN


     salmeterol (Verified  Allergy, Severe, Shortness of Breath, 4/13/18)


     simvastatin (Verified  Allergy, Severe, MUSCLE ACHES, JOINT PAIN, 

SENSITIVE SKIN, 4/13/18)


 FOGGY BRAIN


     adhesive (Verified  Allergy, Mild, RASH, 4/13/18)


 MAY USE PAPER TAPE ONLY AND TEGADERM


     hydroxychloroquine (Verified  Adverse Reaction, Severe, MUSCLE ACHING, 4/13 /18)


 VISUAL DISTURBANCES


     atenolol (Verified  Adverse Reaction, Intermediate, SHORTNESS OF BREATH , 

SEVERE LETHARGIC AND EXHUSTED, WEIGHT , 4/13/18)


 PATIENT REPORTS SHE STOPPED TAKING THIS MEDICATION 5 WEEKS


 AGO - SHE WAS SLOWLY WEANED OFF PER DR. JOLLEY


     atorvastatin (Verified  Adverse Reaction, Intermediate, NEURALGIA, 4/13/18)


 FOGGY BRAIN


Uncoded Allergies:  


     NICKEL (Allergy, Intermediate, RASH, BURN, 6/15/15)


 CONFIRMED 8/29/14 TM


     STERI STRIPS (Allergy, Intermediate, RASH, BURN, 6/15/15)


 CONFIRMED 8/29/14 TM


Reported Meds & Prescriptions





Reported Meds & Active Scripts


Active


Aspirin Low Dose (Aspirin) 81 Mg Chew 81 Mg CHEW BID 30 Days


Reported


Methotrexate 2.5 Mg Tab 15 Mg PO Q7D


Bethanechol 50 Mg Tab 50 Mg PO QID


Ambien (Zolpidem Tartrate) 10 Mg Tab 10 Mg PO HS PRN


Spironolactone 25 Mg Tab 25 Mg PO DAILY


Clindamycin (Clindamycin HCl) 300 Mg Cap 600 Mg PO ONCE PRN


     4 hours prior to dental procedure


Transderm-Scop (Scopolamine) 1 Mg/3 Days Dis 1 Patch T-DERMAL Q3D PRN


Dexamethasone 4 Mg Tab 4 Mg PO AS DIRECTED


     5 tabs 3x daily for 5-7 days then wean down


Lidoderm (Lidocaine) 5 % Adh..patch 1 Patch T-DERMAL Q12HR PRN


Folic Acid 1 Mg Tablet 1 Mg PO HS


Tylenol (Acetaminophen) 325 Mg Tab 650 Mg PO HS PRN


Gabapentin 600 Mg Tab 600 Mg PO BID


Tessalon Perles (Benzonatate) 100 Mg Cap 200 Mg PO TID PRN


Zofran Odt (Ondansetron Odt) 8 Mg Tab 8 Mg SL Q8H PRN


Butorphanol Nasal Spray (Butorphanol Tartrate) 10 Mg/Ml Soln 1 Spray NASAL Q4HR 

PRN


     in one nostril (1 mg). If pain not reliefed in 60-90 minutes, a 1 mg


     repeat dose may be given.  May repeat this in 3-4 hrs if needed.


Epipen 2-Frank Inj (Epinephrine) 0.3 Mg/0.3 Ml Pfpen 0.3 Mg IM ONCE PRN


Xopenex Hfa 15 GM Inh (Levalbuterol 15 GM Inh) 45 Mcg/Act Aer 45 Mcg INH Q6HR


     Shake well before using. (1 puff = 45 mcg)


Xopenex Neb (Levalbuterol HCl) 0.63 Mg/3 Ml Neb 0.63 Mg NEB QID


Ipratropium Neb (Ipratropium Bromide) 0.5 Mg/2.5 Ml Amp 0.5 Mg NEB Q6HR NEB


Amlodipine (Amlodipine Besylate) 2.5 Mg Tab 2.5 Mg PO DAILY PRN


Singulair (Montelukast Sodium) 10 Mg Tab 10 Mg PO HS


Incruse Ellipta Inh (Umeclidinium Bromide Inh) 0.0625 Mg/Act Inh 1 Puff INH 

DAILY


Tramadol (Tramadol HCl) 50 Mg Tab 50 Mg PO QID


Cartia Xt (Diltiazem ER 24 HR) 120 Mg Caper 120 Mg PO DAILY


Azithromycin 250 Mg Tab 250 Mg PO HS


     On for 1 week, off for 1 week


Protonix (Pantoprazole Sodium) 40 Mg Tab 40 Mg PO HS








Review of Systems


General / Constitutional:  No: Fever


Eyes:  No: Visual changes


HENT:  No: Headaches


Cardiovascular:  No: Chest Pain or Discomfort


Respiratory:  Positive: Shortness of Breath


Gastrointestinal:  No: Abdominal Pain


Genitourinary:  No: Dysuria


Musculoskeletal:  No: Pain


Skin:  No Rash


Neurologic:  No: Weakness


Psychiatric:  No: Depression


Endocrine:  No: Polydipsia


Hematologic/Lymphatic:  No: Easy Bruising





Physical Exam


Narrative


GENERAL: 


SKIN: Warm and dry.


HEAD: Atraumatic. Normocephalic. 


EYES: Pupils equal and round. No scleral icterus. No injection or drainage. 


ENT: No nasal bleeding or discharge.  Mucous membranes pink and moist.  No 

uvular edema,


NECK: Trachea midline. No JVD.  Audible stridor at rest


CARDIOVASCULAR: Regular rate and rhythm.  


RESPIRATORY: No accessory muscle use. Clear to auscultation. Breath sounds 

equal bilaterally.  Patient makes some upper respiratory sounding wheezing and 

stridor.


GASTROINTESTINAL: Abdomen soft, non-tender, nondistended. 


MUSCULOSKELETAL: Extremities without clubbing, cyanosis, or edema. No obvious 

deformities. 


NEUROLOGICAL: Awake and alert. No obvious cranial nerve deficits.  Motor 

grossly within normal limits. Five out of 5 muscle strength in the arms and 

legs.  Normal speech.


PSYCHIATRIC: Appropriate mood and affect; insight and judgment normal.





Data


Data


Last Documented VS





Vital Signs








  Date Time  Temp Pulse Resp B/P (MAP) Pulse Ox O2 Delivery O2 Flow Rate FiO2


 


4/13/18 13:12  89 22 185/85 (118) 100 Room Air  


 


4/13/18 12:09 97.5       








Orders





 Orders


Complete Blood Count With Diff (4/13/18 12:15)


Comprehensive Metabolic Panel (4/13/18 12:15)


B-Type Natriuretic Peptide (4/13/18 12:15)


Act Partial Throm Time (Ptt) (4/13/18 12:15)


Prothrombin Time / Inr (Pt) (4/13/18 12:15)


Ckmb (Isoenzyme) Profile (4/13/18 12:15)


Troponin I (4/13/18 12:15)


Influenzae A/B Antigen (4/13/18 12:15)


Blood Culture (4/13/18 12:15)


Iv Access Insert/Monitor (4/13/18 12:15)


Electrocardiogram (4/13/18 12:15)


Ecg Monitoring (4/13/18 12:15)


Oximetry (4/13/18 12:15)


Oxygen Administration (4/13/18 12:15)


Chest, Single Ap (4/13/18 12:15)


Ct Pulmonary Angiogram (4/13/18 12:15)


Sodium Chloride 0.9% Flush (Ns Flush) (4/13/18 12:15)


Methylprednisolone So Succ Inj (Solumedr (4/13/18 12:15)


Albuterol-Ipratropium Neb (Duoneb Neb) (4/13/18 12:15)


Chlorphenir-Hydrocodone Liq (Tussionex L (4/13/18 12:15)


Racemic Epinephrine 2.25% Neb (Racepinep (4/13/18 13:00)


Oseltamivir (Tamiflu) (4/13/18 13:15)


Iohexol 350 Inj (Omnipaque 350 Inj) (4/13/18 13:35)


Admit Order (Ed Use Only) (4/13/18 13:43)





Labs





Laboratory Tests








Test


  4/13/18


12:20


 


White Blood Count 7.9 TH/MM3 


 


Red Blood Count 4.60 MIL/MM3 


 


Hemoglobin 13.5 GM/DL 


 


Hematocrit 41.2 % 


 


Mean Corpuscular Volume 89.6 FL 


 


Mean Corpuscular Hemoglobin 29.3 PG 


 


Mean Corpuscular Hemoglobin


Concent 32.7 % 


 


 


Red Cell Distribution Width 14.9 % 


 


Platelet Count 321 TH/MM3 


 


Mean Platelet Volume 8.6 FL 


 


Neutrophils (%) (Auto) 67.1 % 


 


Lymphocytes (%) (Auto) 25.0 % 


 


Monocytes (%) (Auto) 6.1 % 


 


Eosinophils (%) (Auto) 1.2 % 


 


Basophils (%) (Auto) 0.6 % 


 


Neutrophils # (Auto) 5.3 TH/MM3 


 


Lymphocytes # (Auto) 2.0 TH/MM3 


 


Monocytes # (Auto) 0.5 TH/MM3 


 


Eosinophils # (Auto) 0.1 TH/MM3 


 


Basophils # (Auto) 0.0 TH/MM3 


 


CBC Comment DIFF FINAL 


 


Differential Comment  


 


Prothrombin Time 10.8 SEC 


 


Prothromb Time International


Ratio 1.1 RATIO 


 


 


Activated Partial


Thromboplast Time 25.2 SEC 


 


 


Blood Urea Nitrogen 8 MG/DL 


 


Creatinine 0.80 MG/DL 


 


Random Glucose 95 MG/DL 


 


Total Protein 8.1 GM/DL 


 


Albumin 4.4 GM/DL 


 


Calcium Level 9.3 MG/DL 


 


Alkaline Phosphatase 106 U/L 


 


Aspartate Amino Transf


(AST/SGOT) 18 U/L 


 


 


Alanine Aminotransferase


(ALT/SGPT) 23 U/L 


 


 


Total Bilirubin 0.3 MG/DL 


 


Sodium Level 139 MEQ/L 


 


Potassium Level 3.7 MEQ/L 


 


Chloride Level 105 MEQ/L 


 


Carbon Dioxide Level 25.9 MEQ/L 


 


Anion Gap 8 MEQ/L 


 


Estimat Glomerular Filtration


Rate 74 ML/MIN 


 


 


Total Creatine Kinase 88 U/L 


 


Troponin I


  LESS THAN 0.02


NG/ML


 


B-Type Natriuretic Peptide 48 PG/ML 











MDM


Medical Decision Making


Medical Screen Exam Complete:  Yes


Emergency Medical Condition:  Yes


Medical Record Reviewed:  Yes


Interpretation(s)


EKG shows normal sinus rhythm, 81 bpm, nonspecific ST-T wave changes, there are 

some baseline motion artifact.  But no evidence of any STEMI pattern noted


Differential Diagnosis


COPD exacerbation versus asthma exacerbation versus pneumonia versus pleural 

effusion versus pericardial effusion


Narrative Course


CBC shows no leukocytosis, no anemia, normal platelet count no left shift


Coagulation profile is within normal limits


Electrolytes are all within normal limits, normal kidney functions normal liver 

functions, beta natruretic peptide is negative


Flu positive


CT chest shows no evidence of any pericardial effusion, no evidence of a 

pulmonary embolism, and the radiologist report he only noted coronary artery 

calcifications but otherwise unremarkable examination of the chest.





Physician Communication


Physician Communication


Received call from Dr. Casiano, giving us a heads up that he was going to send 

this patient over, due to the low level of pulse ox at his office he was 

concerned enough and highly recommended admitting the patient after I had done 

my initial evaluation and care for this patient.





Diagnosis





 Primary Impression:  


 Influenza


 Additional Impression:  


 Stridor











Loyd Shine MD Apr 13, 2018 12:09
Opt out